# Patient Record
Sex: FEMALE | Race: WHITE | NOT HISPANIC OR LATINO | Employment: OTHER | URBAN - METROPOLITAN AREA
[De-identification: names, ages, dates, MRNs, and addresses within clinical notes are randomized per-mention and may not be internally consistent; named-entity substitution may affect disease eponyms.]

---

## 2024-03-01 RX ORDER — PRAVASTATIN SODIUM 40 MG/1
40 TABLET ORAL DAILY
COMMUNITY

## 2024-03-01 RX ORDER — FOLIC ACID 1 MG/1
1 TABLET ORAL DAILY
COMMUNITY

## 2024-03-01 RX ORDER — PAROXETINE 30 MG/1
30 TABLET, FILM COATED ORAL EVERY MORNING
COMMUNITY

## 2024-03-01 RX ORDER — OXYBUTYNIN CHLORIDE 5 MG/1
5 TABLET, EXTENDED RELEASE ORAL DAILY
Status: ON HOLD | COMMUNITY
End: 2024-03-19 | Stop reason: HOSPADM

## 2024-03-01 RX ORDER — BISOPROLOL FUMARATE 5 MG/1
5 TABLET, FILM COATED ORAL DAILY
COMMUNITY

## 2024-03-06 ENCOUNTER — TELEPHONE (OUTPATIENT)
Dept: PREADMISSION TESTING | Facility: HOSPITAL | Age: 79
End: 2024-03-06
Payer: MEDICARE

## 2024-03-06 ENCOUNTER — HOSPITAL ENCOUNTER (OUTPATIENT)
Dept: RADIOLOGY | Facility: HOSPITAL | Age: 79
Discharge: HOME | End: 2024-03-06
Attending: UROLOGY
Payer: MEDICARE

## 2024-03-06 ENCOUNTER — HOSPITAL ENCOUNTER (OUTPATIENT)
Dept: CARDIOLOGY | Facility: HOSPITAL | Age: 79
Discharge: HOME | End: 2024-03-06
Attending: UROLOGY
Payer: MEDICARE

## 2024-03-06 ENCOUNTER — OFFICE VISIT (OUTPATIENT)
Dept: UROLOGY | Facility: CLINIC | Age: 79
End: 2024-03-06
Payer: MEDICARE

## 2024-03-06 VITALS
SYSTOLIC BLOOD PRESSURE: 112 MMHG | BODY MASS INDEX: 23.71 KG/M2 | HEART RATE: 60 BPM | WEIGHT: 133.81 LBS | DIASTOLIC BLOOD PRESSURE: 69 MMHG | HEIGHT: 63 IN

## 2024-03-06 DIAGNOSIS — Z01.818 PRE-OP TESTING: ICD-10-CM

## 2024-03-06 DIAGNOSIS — N30.00 ACUTE CYSTITIS WITHOUT HEMATURIA: ICD-10-CM

## 2024-03-06 DIAGNOSIS — R33.9 INCOMPLETE BLADDER EMPTYING: ICD-10-CM

## 2024-03-06 DIAGNOSIS — N13.39 OTHER HYDRONEPHROSIS: ICD-10-CM

## 2024-03-06 DIAGNOSIS — R30.0 DYSURIA: Primary | ICD-10-CM

## 2024-03-06 LAB
BILIRUB UR QL STRIP: NEGATIVE
GLUCOSE UR QL STRIP: NEGATIVE
KETONES UR QL STRIP: NEGATIVE
LEUKOCYTE ESTERASE UR QL STRIP: POSITIVE
OHS QRS DURATION: 84 MS
OHS QTC CALCULATION: 424 MS
PH, POC UA: 6.5
POC BLOOD, URINE: POSITIVE
POC NITRATES, URINE: NEGATIVE
POC RESIDUAL URINE VOLUME: 160 ML (ref 0–100)
PROT UR QL STRIP: POSITIVE
SP GR UR STRIP: 1.01 (ref 1–1.03)
UROBILINOGEN UR STRIP-ACNC: 0.2 (ref 0.1–1.1)

## 2024-03-06 PROCEDURE — 51798 US URINE CAPACITY MEASURE: CPT | Mod: S$GLB,,, | Performed by: UROLOGY

## 2024-03-06 PROCEDURE — 3074F SYST BP LT 130 MM HG: CPT | Mod: CPTII,S$GLB,, | Performed by: UROLOGY

## 2024-03-06 PROCEDURE — 3078F DIAST BP <80 MM HG: CPT | Mod: CPTII,S$GLB,, | Performed by: UROLOGY

## 2024-03-06 PROCEDURE — 99999 PR PBB SHADOW E&M-EST. PATIENT-LVL IV: CPT | Mod: PBBFAC,,, | Performed by: UROLOGY

## 2024-03-06 PROCEDURE — 76770 US EXAM ABDO BACK WALL COMP: CPT | Mod: TC

## 2024-03-06 PROCEDURE — 99215 OFFICE O/P EST HI 40 MIN: CPT | Mod: S$GLB,,, | Performed by: UROLOGY

## 2024-03-06 PROCEDURE — 81003 URINALYSIS AUTO W/O SCOPE: CPT | Mod: QW,S$GLB,, | Performed by: UROLOGY

## 2024-03-06 PROCEDURE — 76770 US EXAM ABDO BACK WALL COMP: CPT | Mod: 26,,, | Performed by: RADIOLOGY

## 2024-03-06 PROCEDURE — 74018 RADEX ABDOMEN 1 VIEW: CPT | Mod: TC

## 2024-03-06 PROCEDURE — 93005 ELECTROCARDIOGRAM TRACING: CPT

## 2024-03-06 PROCEDURE — 1126F AMNT PAIN NOTED NONE PRSNT: CPT | Mod: CPTII,S$GLB,, | Performed by: UROLOGY

## 2024-03-06 PROCEDURE — 93010 ELECTROCARDIOGRAM REPORT: CPT | Mod: ,,, | Performed by: INTERNAL MEDICINE

## 2024-03-06 PROCEDURE — 3288F FALL RISK ASSESSMENT DOCD: CPT | Mod: CPTII,S$GLB,, | Performed by: UROLOGY

## 2024-03-06 PROCEDURE — 71046 X-RAY EXAM CHEST 2 VIEWS: CPT | Mod: 26,,, | Performed by: RADIOLOGY

## 2024-03-06 PROCEDURE — 1101F PT FALLS ASSESS-DOCD LE1/YR: CPT | Mod: CPTII,S$GLB,, | Performed by: UROLOGY

## 2024-03-06 PROCEDURE — 87086 URINE CULTURE/COLONY COUNT: CPT | Performed by: UROLOGY

## 2024-03-06 PROCEDURE — 74018 RADEX ABDOMEN 1 VIEW: CPT | Mod: 26,,, | Performed by: RADIOLOGY

## 2024-03-06 PROCEDURE — 71046 X-RAY EXAM CHEST 2 VIEWS: CPT | Mod: TC

## 2024-03-06 RX ORDER — PHENAZOPYRIDINE HYDROCHLORIDE 100 MG/1
100 TABLET, FILM COATED ORAL 3 TIMES DAILY PRN
Qty: 30 TABLET | Refills: 3 | Status: SHIPPED | OUTPATIENT
Start: 2024-03-06 | End: 2024-03-16

## 2024-03-06 NOTE — TELEPHONE ENCOUNTER
Pt had abnormal EKG. Needs cardiac clearance. Spoke with patient who stated that she does not see a cardiologist. Called appointment desk to assist patient in scheduling an appointment with cardiology to obtain cardiac clearance prior to surgery date (03/19). Apt 3/8 with Dr. Streeter. Pt v/u.  
Home

## 2024-03-06 NOTE — H&P (VIEW-ONLY)
Subjective:      Patient ID: Lovely Lopez is a 78 y.o. female.    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  She is undergoing chronic right ureteral stent exchanges.  Her most recent right ureteral stent exchange was in October 2023.  Denies any current flank or abdominal pain.  She is having some dysuria.  No gross hematuria.  No significant urgency or frequency.  She has incomplete bladder emptying.  She has a history of urinary retention in October 2023.  She is no longer taking oxybutynin.      Review of Systems   Constitutional:  Negative for chills and fever.   Respiratory:  Negative for shortness of breath.    Cardiovascular:  Negative for chest pain.   Gastrointestinal:  Negative for nausea and vomiting.   Genitourinary:  Positive for dysuria. Negative for difficulty urinating and hematuria.   Musculoskeletal:  Negative for back pain.   Skin:  Negative for rash.   Neurological:  Negative for dizziness.   Psychiatric/Behavioral:  Negative for agitation.            Objective:     Physical Exam  Constitutional:       General: She is not in acute distress.  Cardiovascular:      Rate and Rhythm: Normal rate.   Pulmonary:      Effort: Pulmonary effort is normal.   Abdominal:      General: There is no distension.      Palpations: Abdomen is soft.   Neurological:      Mental Status: She is alert and oriented to person, place, and time.         Assessment:      1. Pre-op testing    2. Acute cystitis without hematuria    3. Other hydronephrosis    4. Incomplete bladder emptying    5. 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  She is undergoing chronic right ureteral stent exchanges.  Her most recent right ureteral stent exchange was in October 2023.    - Cystitis.  - Dysuria.  - Incomplete bladder emptying.  She has a history of urinary retention in October 2023.      Plan:  - I discussed options with the patient today and the mutual decision was made to proceed to the OR for a cystoscopy, right retrograde pyelogram, right ureteral stent exchange, and another other indicated procedures.  Risks and benefits of the surgery were explained to the patient and the patient expressed understanding.  All questions were answered by me to the patient's apparent understanding and to the patient's apparent satisfaction.  Surgery consent was signed today by the patient.  - CBC, CMP, PT/PTT, chest x-ray, and EKG today.   - Urine culture today.  - KUB and renal ultrasound today.  - I prescribed her phenazopyridine 100 mg 1 PO q 8 hours PRN dysuria, disp #30, 3 refills today on 3-6-24.

## 2024-03-06 NOTE — PROGRESS NOTES
Subjective     Patient ID: Lovely Lopez is a 78 y.o. female.    Chief Complaint: Establishment    HPI  Review of Systems       Objective     Physical Exam       Assessment and Plan     1. Acute cystitis without hematuria  -     POCT Bladder Scan        ***         No follow-ups on file.

## 2024-03-08 ENCOUNTER — OFFICE VISIT (OUTPATIENT)
Dept: CARDIOLOGY | Facility: CLINIC | Age: 79
End: 2024-03-08
Payer: MEDICARE

## 2024-03-08 ENCOUNTER — TELEPHONE (OUTPATIENT)
Dept: UROLOGY | Facility: CLINIC | Age: 79
End: 2024-03-08
Payer: MEDICARE

## 2024-03-08 VITALS
HEART RATE: 68 BPM | HEIGHT: 63 IN | SYSTOLIC BLOOD PRESSURE: 112 MMHG | WEIGHT: 133.38 LBS | OXYGEN SATURATION: 95 % | DIASTOLIC BLOOD PRESSURE: 74 MMHG | BODY MASS INDEX: 23.63 KG/M2

## 2024-03-08 DIAGNOSIS — R94.31 ABNORMAL EKG: ICD-10-CM

## 2024-03-08 DIAGNOSIS — I70.0 AORTIC ATHEROSCLEROSIS: Primary | ICD-10-CM

## 2024-03-08 DIAGNOSIS — R00.1 BRADYCARDIA: ICD-10-CM

## 2024-03-08 DIAGNOSIS — N18.31 STAGE 3A CHRONIC KIDNEY DISEASE: ICD-10-CM

## 2024-03-08 DIAGNOSIS — Z01.810 PREOP CARDIOVASCULAR EXAM: ICD-10-CM

## 2024-03-08 DIAGNOSIS — I10 PRIMARY HYPERTENSION: ICD-10-CM

## 2024-03-08 DIAGNOSIS — E78.00 PURE HYPERCHOLESTEROLEMIA: ICD-10-CM

## 2024-03-08 LAB — BACTERIA UR CULT: NO GROWTH

## 2024-03-08 PROCEDURE — 1101F PT FALLS ASSESS-DOCD LE1/YR: CPT | Mod: CPTII,S$GLB,, | Performed by: INTERNAL MEDICINE

## 2024-03-08 PROCEDURE — 3288F FALL RISK ASSESSMENT DOCD: CPT | Mod: CPTII,S$GLB,, | Performed by: INTERNAL MEDICINE

## 2024-03-08 PROCEDURE — 1160F RVW MEDS BY RX/DR IN RCRD: CPT | Mod: CPTII,S$GLB,, | Performed by: INTERNAL MEDICINE

## 2024-03-08 PROCEDURE — 99203 OFFICE O/P NEW LOW 30 MIN: CPT | Mod: S$GLB,,, | Performed by: INTERNAL MEDICINE

## 2024-03-08 PROCEDURE — 1159F MED LIST DOCD IN RCRD: CPT | Mod: CPTII,S$GLB,, | Performed by: INTERNAL MEDICINE

## 2024-03-08 PROCEDURE — 3078F DIAST BP <80 MM HG: CPT | Mod: CPTII,S$GLB,, | Performed by: INTERNAL MEDICINE

## 2024-03-08 PROCEDURE — 99999 PR PBB SHADOW E&M-EST. PATIENT-LVL III: CPT | Mod: PBBFAC,,, | Performed by: INTERNAL MEDICINE

## 2024-03-08 PROCEDURE — 3074F SYST BP LT 130 MM HG: CPT | Mod: CPTII,S$GLB,, | Performed by: INTERNAL MEDICINE

## 2024-03-08 NOTE — PROGRESS NOTES
Subjective:   Patient ID:  Lovely Madsen is a 78 y.o. female who presents for evaluation of No chief complaint on file.      HPI  A 77 yo feamle with htn hlp abnormal EKG is here fro preop eval due to abnormal EKG she ahs sinus bradycardia she is on b blockers for htn. She is fairly active has no cardiac symptoms.   Past Medical History:   Diagnosis Date    Abnormal EKG 03/08/2024    Acute cystitis without hematuria 02/17/2022    First seen 02/17/2022, Last seen 09/19/2023    Cyst of kidney, acquired 05/20/2016    First seen 05/20/2016, Last seen 12/14/2022    Dysuria 09/19/2023    History of kidney cancer 2018    Hydronephrosis with ureteral stricture, not elsewhere classified 11/08/2022    Kidney stone 05/25/2022    Malignant neoplasm of left kidney, except renal pelvis 2016    Neoplasm of uncertain behavior of left kidney 2016    Other hydronephrosis 03/04/2020    First seen 03/04/2020, Last seen 09/19/2023    Other retention of urine 12/16/2022    Overactive bladder 09/03/2019    First seen 09/03/2019, Last seen 09/19/2023    Preop cardiovascular exam 03/08/2024    Stage 3a chronic kidney disease 03/08/2024       Past Surgical History:   Procedure Laterality Date    PARTIAL NEPHRECTOMY Left 07/18/2016    Left robotic partial nephrectomy       Social History     Tobacco Use    Smoking status: Never    Smokeless tobacco: Never   Substance Use Topics    Alcohol use: Not Currently    Drug use: Never       Family History   Problem Relation Age of Onset    Alzheimer's disease Father        Current Outpatient Medications   Medication Sig    bisoprolol (ZEBETA) 5 MG tablet Take 5 mg by mouth once daily.    folic acid (FOLVITE) 1 MG tablet Take 1 mg by mouth once daily.    oxybutynin (DITROPAN-XL) 5 MG TR24 Take 5 mg by mouth once daily.    paroxetine (PAXIL) 30 MG tablet Take 30 mg by mouth every morning.    phenazopyridine (PYRIDIUM) 100 MG tablet Take 1 tablet (100 mg total) by mouth 3 (three) times daily as needed  for Pain (Take 1 by mouth every 8 hours as needed for burning with urination.).    pravastatin (PRAVACHOL) 40 MG tablet Take 40 mg by mouth once daily.     No current facility-administered medications for this visit.     Current Outpatient Medications on File Prior to Visit   Medication Sig    bisoprolol (ZEBETA) 5 MG tablet Take 5 mg by mouth once daily.    folic acid (FOLVITE) 1 MG tablet Take 1 mg by mouth once daily.    oxybutynin (DITROPAN-XL) 5 MG TR24 Take 5 mg by mouth once daily.    paroxetine (PAXIL) 30 MG tablet Take 30 mg by mouth every morning.    phenazopyridine (PYRIDIUM) 100 MG tablet Take 1 tablet (100 mg total) by mouth 3 (three) times daily as needed for Pain (Take 1 by mouth every 8 hours as needed for burning with urination.).    pravastatin (PRAVACHOL) 40 MG tablet Take 40 mg by mouth once daily.     No current facility-administered medications on file prior to visit.       Review of patient's allergies indicates:  No Known Allergies    Review of Systems   Constitutional: Negative for malaise/fatigue.   Eyes:  Negative for blurred vision.   Cardiovascular:  Negative for chest pain, claudication, cyanosis, dyspnea on exertion, irregular heartbeat, leg swelling, near-syncope, orthopnea, palpitations and paroxysmal nocturnal dyspnea.   Respiratory:  Negative for cough, hemoptysis and shortness of breath.    Hematologic/Lymphatic: Negative for bleeding problem. Does not bruise/bleed easily.   Skin:  Negative for dry skin and itching.   Musculoskeletal:  Negative for falls, muscle weakness and myalgias.   Gastrointestinal:  Negative for abdominal pain, diarrhea, heartburn, hematemesis, hematochezia and melena.   Genitourinary:  Negative for flank pain and hematuria.   Neurological:  Negative for dizziness, focal weakness, headaches, light-headedness, numbness, paresthesias, seizures and weakness.   Psychiatric/Behavioral:  Negative for altered mental status and memory loss. The patient is not  "nervous/anxious.    Allergic/Immunologic: Negative for hives.       Objective:   Physical Exam  Constitutional:       General: She is not in acute distress.     Appearance: Normal appearance. She is well-developed. She is not ill-appearing.   HENT:      Head: Normocephalic and atraumatic.   Eyes:      General: No scleral icterus.     Pupils: Pupils are equal, round, and reactive to light.   Neck:      Thyroid: No thyromegaly.      Vascular: Normal carotid pulses. No carotid bruit, hepatojugular reflux or JVD.      Trachea: No tracheal deviation.   Cardiovascular:      Rate and Rhythm: Normal rate and regular rhythm.      Pulses: Normal pulses.      Heart sounds: Normal heart sounds. No murmur heard.     No friction rub. No gallop.   Pulmonary:      Effort: Pulmonary effort is normal. No respiratory distress.      Breath sounds: Normal breath sounds. No wheezing, rhonchi or rales.   Chest:      Chest wall: No tenderness.   Abdominal:      General: Bowel sounds are normal. There is no abdominal bruit.      Palpations: Abdomen is soft. There is no hepatomegaly or pulsatile mass.      Tenderness: There is no abdominal tenderness.   Musculoskeletal:      Right shoulder: No deformity.      Cervical back: Normal range of motion and neck supple.   Skin:     General: Skin is warm and dry.      Findings: No erythema or rash.      Nails: There is no clubbing.   Neurological:      Mental Status: She is alert and oriented to person, place, and time.      Cranial Nerves: No cranial nerve deficit.      Coordination: Coordination normal.   Psychiatric:         Speech: Speech normal.         Behavior: Behavior normal.       Vitals:    03/08/24 0910 03/08/24 0912   BP: 116/70 112/74   BP Location: Left arm Right arm   Patient Position: Sitting Sitting   BP Method: Medium (Manual) Medium (Manual)   Pulse: 68    SpO2: 95%    Weight: 60.5 kg (133 lb 6.1 oz)    Height: 5' 3" (1.6 m)      No results found for: "CHOL"  Body mass index is " "23.63 kg/m².   No results found for: "LABA1C", "HGBA1C"   BMP  Lab Results   Component Value Date     03/06/2024    K 3.8 03/06/2024     03/06/2024    CO2 31 (H) 03/06/2024    BUN 21 03/06/2024    CREATININE 1.2 03/06/2024    CALCIUM 9.8 03/06/2024    ANIONGAP 8 03/06/2024    EGFRNORACEVR 46.3 (A) 03/06/2024      No results found for: "HDL"  No results found for: "LDLCALC"  No results found for: "TRIG"  No results found for: "CHOLHDL"    Chemistry        Component Value Date/Time     03/06/2024 1053    K 3.8 03/06/2024 1053     03/06/2024 1053    CO2 31 (H) 03/06/2024 1053    BUN 21 03/06/2024 1053    CREATININE 1.2 03/06/2024 1053    GLU 89 03/06/2024 1053        Component Value Date/Time    CALCIUM 9.8 03/06/2024 1053          Lab Results   Component Value Date    TSH 2.47 05/16/2023     No results found for: "INR", "PROTIME"  Lab Results   Component Value Date    WBC 4.96 03/06/2024    HGB 12.6 03/06/2024    HCT 38.7 03/06/2024    MCV 94 03/06/2024     03/06/2024     BNP  @LABRCNTIP(BNP,BNPTRIAGEBLO)@  Estimated Creatinine Clearance: 32 mL/min (based on SCr of 1.2 mg/dL).  Assessment:     1. Aortic atherosclerosis    2. Preop cardiovascular exam    3. Stage 3a chronic kidney disease    4. Abnormal EKG    5. Bradycardia    6. Primary hypertension    7. Pure hypercholesterolemia      Asymptomatic with good exercise tolerance with a symptomatic sinus bradycardia with good control of htn and hlp needing surgery. no cardiac contraindications proceed as planned/  Plan:   Continue current therapy  Cardiac low salt diet.  Risk factor modification and excercise program.  F/u prn    "

## 2024-03-08 NOTE — TELEPHONE ENCOUNTER
M for patient that her surgery was scheduled for 03/19/2024 and below per Dr. Bee.    Loree Bone LPN    ----- Message from Wallace Bee MD sent at 3/8/2024  9:57 AM CST -----  Please call Ms Madsen and let her know that I reviewed her urine culture result and that it did not show a urinary infection.  Make sure that she knows the details of her surgery.  I recommend that she drink mostly water.  Ask her if she has any questions.

## 2024-03-14 ENCOUNTER — TELEPHONE (OUTPATIENT)
Dept: UROLOGY | Facility: CLINIC | Age: 79
End: 2024-03-14
Payer: MEDICARE

## 2024-03-14 ENCOUNTER — TELEPHONE (OUTPATIENT)
Dept: PREADMISSION TESTING | Facility: HOSPITAL | Age: 79
End: 2024-03-14
Payer: MEDICARE

## 2024-03-14 RX ORDER — DONEPEZIL HYDROCHLORIDE 10 MG/1
10 TABLET, FILM COATED ORAL
COMMUNITY
Start: 2023-09-22

## 2024-03-14 RX ORDER — TAMSULOSIN HYDROCHLORIDE 0.4 MG/1
0.4 CAPSULE ORAL
COMMUNITY

## 2024-03-14 RX ORDER — SULFAMETHOXAZOLE AND TRIMETHOPRIM 800; 160 MG/1; MG/1
1 TABLET ORAL 2 TIMES DAILY
Status: ON HOLD | COMMUNITY
Start: 2023-09-19 | End: 2024-03-19 | Stop reason: HOSPADM

## 2024-03-14 RX ORDER — MEMANTINE HYDROCHLORIDE 5 MG/1
5 TABLET ORAL
COMMUNITY
Start: 2023-07-19

## 2024-03-14 RX ORDER — GABAPENTIN 300 MG/1
300 CAPSULE ORAL 3 TIMES DAILY
COMMUNITY

## 2024-03-14 RX ORDER — MULTIVIT-MIN/FERROUS SULFATE 4.5 MG
TABLET ORAL
COMMUNITY

## 2024-03-14 RX ORDER — OMEPRAZOLE 40 MG/1
40 CAPSULE, DELAYED RELEASE ORAL
COMMUNITY
Start: 2023-09-18

## 2024-03-14 RX ORDER — CETIRIZINE HYDROCHLORIDE 10 MG/1
10 TABLET ORAL
COMMUNITY

## 2024-03-14 RX ORDER — KETOCONAZOLE 20 MG/ML
SHAMPOO, SUSPENSION TOPICAL
COMMUNITY
Start: 2023-12-13

## 2024-03-14 RX ORDER — MEMANTINE HYDROCHLORIDE 10 MG/1
10 TABLET ORAL 2 TIMES DAILY
COMMUNITY

## 2024-03-14 RX ORDER — CLOBETASOL PROPIONATE 0.46 MG/ML
SOLUTION TOPICAL
COMMUNITY
Start: 2023-05-08 | End: 2024-05-07

## 2024-03-14 RX ORDER — COLESTIPOL HYDROCHLORIDE 5 G/5G
GRANULE, FOR SUSPENSION ORAL
COMMUNITY
Start: 2023-10-15

## 2024-03-14 RX ORDER — HYDROCHLOROTHIAZIDE 12.5 MG/1
12.5 TABLET ORAL
COMMUNITY

## 2024-03-14 RX ORDER — UBIDECARENONE 75 MG
CAPSULE ORAL
COMMUNITY

## 2024-03-14 RX ORDER — ESCITALOPRAM OXALATE 10 MG/1
10 TABLET ORAL
COMMUNITY

## 2024-03-14 NOTE — TELEPHONE ENCOUNTER
Pre op instructions reviewed with patient per phone.      To confirm, your doctor has instructed you: Surgery is scheduled for 03/19/24.   Pre admit office will call the afternoon prior to surgery between 1PM and 3PM with arrival time.    Surgery will be at [facility],  The address is [address]. [place] [address].      IMPORTANT INSTRUCTIONS!    Do not eat or drink after 12 midnight, including water. Do not smoke or use chewing tobacco after 12 midnight!  OK to brush teeth, but no gum, candy, or mints!      *Take only these medicines with a small swallow of water-morning of surgery*     Pravastatin, bisoprolol, paxil, donepezil, lexapro, gabapentin, namenda, omeprazole       ____ Stop taking all vitamins, herbal supplements, Aspirin, & NSAIDS (Ibuprofen, Advil, Aleve) 7 days prior to surgery, as these can thin your blood.    ____ Weight loss medication, such as Adipex and Phentermine, must be stopped 14 days prior to surgery, no exceptions!    *Diabetic Patients: If you take diabetic or weight loss medication, do NOT take morning of surgery unless instructed by Doctor. Metformin to be stopped 24 hrs prior to surgery. DO NOT take short-acting insulin the day of surgery. Only take HALF of your regular dose of long-acting insulin the night before surgery, unless instructed otherwise. Blood sugars will be checked in pre-op.   ~Ozempic/Mounjaro/Wegovy/Trulicity/Semaglutide injections must be stopped 7 days prior to surgery.     Please notify MD office if you develop an active infection, are prescribed antibiotics by someone other than the surgeon doing your surgery, or visit urgent care/ER.      Bathing Instructions:   The night before surgery and the morning prior to coming to the hospital:    - Shower & rinse your body as usual with anti-bacterial Soap (Dial or Lever 2000)   -Hibiclens (if indicated) use AFTER anti-bacterial soap; 1 packet PM/1 packet in AM on surgical site only   -Do not use  hibiclens on your head, face, or genitals.    -Do not wash with anti-bacterial soap after you use the hibiclens.    -Do not shave surgical site 5-7 days prior to surgery.    -Pubic hair 7 days prior to surgery (OBGYN/Urology only).       Additional Instructions:   __ No powder, lotions, creams, or body spray to skin   __ No deodorant if you are having: breast procedure, PORT, or upper shoulder surgery!   __ No nail polish or artificial nails       **SURGERY WILL BE CANCELLED IF ARTIFICIAL/NAIL POLISH IS PRESENT!!!**  __ Please remove all piercings and leave all jewelry at home.    **SURGERY WILL BE CANCELLED IF PIERCINGS ARE PRESENT!!!**    __ Dentures, Hearing Aids and Contact Lens need to be removed prior to the start of surgery.    __ Avoid Alcoholic beverages 3 days prior to surgery, as it can thin the blood, unless told otherwise by pre-admit department.  __ Females: may need to give a urine sample the morning of surgery;   **Please ask  for a specimen cup if you need to use the restroom prior to being called into pre-op.**  __ Males: Stop ED medications (Viagra, Cialis) 24 hrs prior to surgery.  __ Wear clean, loose-fitting clothing. Allow for dressings/bandages/surgical equipment   __ You must have transportation, and they MUST stay the entire time.   __  Bring photo ID and insurance information to \[facility]\""sner Visitor/Ride Policy:   Only 1 adult allowed (over the age of 18) to accompany you and MUST STAY through the entire length of admission.     -Must have a ride home from a responsible adult that you know and trust.    -Medical Transport, Uber or Lyft can only be used if patient has a responsible adult to accompany them during ride home.    ~Your ride MUST STAY the entire time until you are discharged~        Post-Op Instructions: You will receive surgery post-op instructions by your Discharge Nurse prior to going home.   Surgical Site Infection:   Prevention of surgical site  infections:   -Keep incisions clean and dry.   -Do not soak/submerge incisions in water until completely healed.   -Do not apply lotions, powders, creams, or deodorants to site.   -Always make sure hands are cleaned with antibacterial soap/ alcohol-based  prior to touching the surgical site.       Signs and symptoms:               -Redness and pain around the area where you had surgery               -Drainage of cloudy fluid from your surgical wound               -Fever over 100.4 or chills     >>>Call Surgeon office/on-call Surgeon if you experience any of these signs & symptoms post-surgery @ [phone].    *If you are running late or have questions the morning of surgery before 7AM, please call the Pre-OP Department @ [phone].    *Please Call [facility] Pre-Admit Department for surgery instruction questions:  [phone]    *Payment questions:  [phone]    *Billing questions:  [phone]

## 2024-03-14 NOTE — TELEPHONE ENCOUNTER
LVM for patient to return call and that she needs to stop her vitamins today so that her surgery can proceed on Tuesday.    Loree Bone LPN

## 2024-03-18 ENCOUNTER — ANESTHESIA EVENT (OUTPATIENT)
Dept: SURGERY | Facility: HOSPITAL | Age: 79
End: 2024-03-18
Payer: MEDICARE

## 2024-03-18 ENCOUNTER — TELEPHONE (OUTPATIENT)
Dept: PREADMISSION TESTING | Facility: HOSPITAL | Age: 79
End: 2024-03-18
Payer: MEDICARE

## 2024-03-18 NOTE — TELEPHONE ENCOUNTER
Called and spoke with the patient about the following:     Your Surgery arrival time is at 7:00 a.m. on 03/19/24 at [facility] location.   The address is [address]. [place] [address].      *If you are running late or have questions the morning of surgery, please call the Pre-OP Department @ [phone].    Only 1 adult allowed (over the age of 18) to accompany you and MUST STAY through the entire length of admission.     -Must have a ride home from a responsible adult that you know and trust.    -Medical Transport, Uber or Lyft can only be used if patient has a responsible adult to accompany them during ride home.  Pediatric patients are encouraged to have 2 adults accompany them to the surgery center.     ~Your ride MUST STAY the entire time until you are discharged~    You may be required to provide a urine sample prior to procedure;   Please ask  for a specimen cup if you need to use the restroom prior to being called into pre-op.    Please come to the main lobby and be prepared to show your photo ID and insurance card.      Nothing to eat or drink after midnight, unless you were instructed to take specific medications discussed with the Pre-admit Nurse.    Please call with any questions or concerns.     [phone]      Thanks.

## 2024-03-18 NOTE — ANESTHESIA PREPROCEDURE EVALUATION
03/18/2024  Lovely Madsen is a 78 y.o., female.  Past Medical History:   Diagnosis Date    Abnormal EKG 03/08/2024    Acute cystitis without hematuria 02/17/2022    First seen 02/17/2022, Last seen 09/19/2023    Cyst of kidney, acquired 05/20/2016    First seen 05/20/2016, Last seen 12/14/2022    Dysuria 09/19/2023    History of kidney cancer 2018    Hydronephrosis with ureteral stricture, not elsewhere classified 11/08/2022    Kidney stone 05/25/2022    Malignant neoplasm of left kidney, except renal pelvis 2016    Neoplasm of uncertain behavior of left kidney 2016    Other hydronephrosis 03/04/2020    First seen 03/04/2020, Last seen 09/19/2023    Other retention of urine 12/16/2022    Overactive bladder 09/03/2019    First seen 09/03/2019, Last seen 09/19/2023    Preop cardiovascular exam 03/08/2024    Stage 3a chronic kidney disease 03/08/2024     Past Surgical History:   Procedure Laterality Date    COLON SURGERY      PARTIAL NEPHRECTOMY Left 07/18/2016    Left robotic partial nephrectomy         Pre-op Assessment    I have reviewed the Patient Summary Reports.    I have reviewed the NPO Status.   I have reviewed the Medications.     Review of Systems  Anesthesia Hx:   History of prior surgery of interest to airway management or planning:            Denies Personal Hx of Anesthesia complications.                    Hematology/Oncology:                        --  Cancer in past history:                     EENT/Dental:  chronic allergic rhinitis           Cardiovascular:     Hypertension           hyperlipidemia                       Hypertension         Renal/:  Chronic Renal Disease, CKD renal calculi       Kidney Function/Disease             Hepatic/GI:     GERD             Psych:    depression                Physical Exam  General: Well nourished    Airway:  Mallampati: II   Mouth Opening:  Normal  TM Distance: Normal  Tongue: Normal  Neck ROM: Normal ROM    Dental:  Intact        Anesthesia Plan  Type of Anesthesia, risks & benefits discussed:    Anesthesia Type: Gen ETT, Gen Supraglottic Airway, Gen Natural Airway, MAC  Intra-op Monitoring Plan: Standard ASA Monitors  Post Op Pain Control Plan: multimodal analgesia  Induction:  IV  Informed Consent: Informed consent signed with the Patient and all parties understand the risks and agree with anesthesia plan.  All questions answered. Patient consented to blood products? No  ASA Score: 3  Day of Surgery Review of History & Physical: H&P Update referred to the surgeon/provider.    Ready For Surgery From Anesthesia Perspective.     .

## 2024-03-19 ENCOUNTER — ANESTHESIA (OUTPATIENT)
Dept: SURGERY | Facility: HOSPITAL | Age: 79
End: 2024-03-19
Payer: MEDICARE

## 2024-03-19 ENCOUNTER — HOSPITAL ENCOUNTER (OUTPATIENT)
Dept: RADIOLOGY | Facility: HOSPITAL | Age: 79
Discharge: HOME | End: 2024-03-19
Attending: UROLOGY | Admitting: UROLOGY
Payer: MEDICARE

## 2024-03-19 ENCOUNTER — TELEPHONE (OUTPATIENT)
Dept: UROLOGY | Facility: CLINIC | Age: 79
End: 2024-03-19
Payer: MEDICARE

## 2024-03-19 ENCOUNTER — HOSPITAL ENCOUNTER (OUTPATIENT)
Facility: HOSPITAL | Age: 79
Discharge: HOME | End: 2024-03-19
Attending: UROLOGY | Admitting: UROLOGY
Payer: MEDICARE

## 2024-03-19 DIAGNOSIS — N13.39 OTHER HYDRONEPHROSIS: ICD-10-CM

## 2024-03-19 DIAGNOSIS — N13.39 OTHER HYDRONEPHROSIS: Primary | ICD-10-CM

## 2024-03-19 DIAGNOSIS — Z01.818 PRE-OP TESTING: ICD-10-CM

## 2024-03-19 PROCEDURE — D9220A PRA ANESTHESIA: Mod: ,,, | Performed by: NURSE ANESTHETIST, CERTIFIED REGISTERED

## 2024-03-19 PROCEDURE — 63600175 PHARM REV CODE 636 W HCPCS: Performed by: NURSE ANESTHETIST, CERTIFIED REGISTERED

## 2024-03-19 PROCEDURE — C1769 GUIDE WIRE: HCPCS | Performed by: UROLOGY

## 2024-03-19 PROCEDURE — C2617 STENT, NON-COR, TEM W/O DEL: HCPCS | Performed by: UROLOGY

## 2024-03-19 PROCEDURE — 71000016 HC POSTOP RECOV ADDL HR: Performed by: UROLOGY

## 2024-03-19 PROCEDURE — 63600175 PHARM REV CODE 636 W HCPCS: Performed by: UROLOGY

## 2024-03-19 PROCEDURE — 25500020 PHARM REV CODE 255: Performed by: UROLOGY

## 2024-03-19 PROCEDURE — 36000707: Performed by: UROLOGY

## 2024-03-19 PROCEDURE — 37000008 HC ANESTHESIA 1ST 15 MINUTES: Performed by: UROLOGY

## 2024-03-19 PROCEDURE — 71000015 HC POSTOP RECOV 1ST HR: Performed by: UROLOGY

## 2024-03-19 PROCEDURE — 36000706: Performed by: UROLOGY

## 2024-03-19 PROCEDURE — 74018 RADEX ABDOMEN 1 VIEW: CPT | Mod: TC

## 2024-03-19 PROCEDURE — 25000003 PHARM REV CODE 250: Performed by: NURSE ANESTHETIST, CERTIFIED REGISTERED

## 2024-03-19 PROCEDURE — 25000003 PHARM REV CODE 250: Performed by: UROLOGY

## 2024-03-19 PROCEDURE — 74420 UROGRAPHY RTRGR +-KUB: CPT | Mod: 26,,, | Performed by: UROLOGY

## 2024-03-19 PROCEDURE — 52332 CYSTOSCOPY AND TREATMENT: CPT | Mod: RT,,, | Performed by: UROLOGY

## 2024-03-19 PROCEDURE — 37000009 HC ANESTHESIA EA ADD 15 MINS: Performed by: UROLOGY

## 2024-03-19 PROCEDURE — 71000033 HC RECOVERY, INTIAL HOUR: Performed by: UROLOGY

## 2024-03-19 PROCEDURE — 74018 RADEX ABDOMEN 1 VIEW: CPT | Mod: 26,,, | Performed by: RADIOLOGY

## 2024-03-19 PROCEDURE — C1758 CATHETER, URETERAL: HCPCS | Performed by: UROLOGY

## 2024-03-19 RX ORDER — ACETAMINOPHEN 10 MG/ML
INJECTION, SOLUTION INTRAVENOUS
Status: DISCONTINUED | OUTPATIENT
Start: 2024-03-19 | End: 2024-03-19

## 2024-03-19 RX ORDER — MEPERIDINE HYDROCHLORIDE 25 MG/ML
12.5 INJECTION INTRAMUSCULAR; INTRAVENOUS; SUBCUTANEOUS ONCE AS NEEDED
Status: DISCONTINUED | OUTPATIENT
Start: 2024-03-19 | End: 2024-03-19 | Stop reason: HOSPADM

## 2024-03-19 RX ORDER — FENTANYL CITRATE 50 UG/ML
25 INJECTION, SOLUTION INTRAMUSCULAR; INTRAVENOUS EVERY 5 MIN PRN
Status: DISCONTINUED | OUTPATIENT
Start: 2024-03-19 | End: 2024-03-19 | Stop reason: HOSPADM

## 2024-03-19 RX ORDER — DEXAMETHASONE SODIUM PHOSPHATE 4 MG/ML
INJECTION, SOLUTION INTRA-ARTICULAR; INTRALESIONAL; INTRAMUSCULAR; INTRAVENOUS; SOFT TISSUE
Status: DISCONTINUED | OUTPATIENT
Start: 2024-03-19 | End: 2024-03-19

## 2024-03-19 RX ORDER — ONDANSETRON HYDROCHLORIDE 2 MG/ML
4 INJECTION, SOLUTION INTRAVENOUS DAILY PRN
Status: DISCONTINUED | OUTPATIENT
Start: 2024-03-19 | End: 2024-03-19 | Stop reason: HOSPADM

## 2024-03-19 RX ORDER — OXYCODONE AND ACETAMINOPHEN 5; 325 MG/1; MG/1
1 TABLET ORAL
Status: DISCONTINUED | OUTPATIENT
Start: 2024-03-19 | End: 2024-03-19 | Stop reason: HOSPADM

## 2024-03-19 RX ORDER — HYDROCODONE BITARTRATE AND ACETAMINOPHEN 5; 325 MG/1; MG/1
1 TABLET ORAL EVERY 6 HOURS PRN
Qty: 20 TABLET | Refills: 0 | Status: SHIPPED | OUTPATIENT
Start: 2024-03-19

## 2024-03-19 RX ORDER — EPHEDRINE SULFATE 50 MG/ML
INJECTION, SOLUTION INTRAVENOUS
Status: DISCONTINUED | OUTPATIENT
Start: 2024-03-19 | End: 2024-03-19

## 2024-03-19 RX ORDER — PROPOFOL 10 MG/ML
VIAL (ML) INTRAVENOUS
Status: DISCONTINUED | OUTPATIENT
Start: 2024-03-19 | End: 2024-03-19

## 2024-03-19 RX ORDER — ONDANSETRON HYDROCHLORIDE 2 MG/ML
INJECTION, SOLUTION INTRAVENOUS
Status: DISCONTINUED | OUTPATIENT
Start: 2024-03-19 | End: 2024-03-19

## 2024-03-19 RX ORDER — LIDOCAINE HYDROCHLORIDE 20 MG/ML
INJECTION, SOLUTION EPIDURAL; INFILTRATION; INTRACAUDAL; PERINEURAL
Status: DISCONTINUED | OUTPATIENT
Start: 2024-03-19 | End: 2024-03-19

## 2024-03-19 RX ORDER — HYOSCYAMINE SULFATE 0.12 MG/1
0.12 TABLET SUBLINGUAL EVERY 6 HOURS PRN
Qty: 30 TABLET | Refills: 6 | Status: SHIPPED | OUTPATIENT
Start: 2024-03-19

## 2024-03-19 RX ORDER — SUCCINYLCHOLINE CHLORIDE 20 MG/ML
INJECTION INTRAMUSCULAR; INTRAVENOUS
Status: DISCONTINUED | OUTPATIENT
Start: 2024-03-19 | End: 2024-03-19

## 2024-03-19 RX ORDER — FENTANYL CITRATE 50 UG/ML
INJECTION, SOLUTION INTRAMUSCULAR; INTRAVENOUS
Status: DISCONTINUED | OUTPATIENT
Start: 2024-03-19 | End: 2024-03-19

## 2024-03-19 RX ADMIN — EPHEDRINE SULFATE 10 MG: 50 INJECTION INTRAVENOUS at 09:03

## 2024-03-19 RX ADMIN — DEXAMETHASONE SODIUM PHOSPHATE 8 MG: 4 INJECTION, SOLUTION INTRA-ARTICULAR; INTRALESIONAL; INTRAMUSCULAR; INTRAVENOUS; SOFT TISSUE at 09:03

## 2024-03-19 RX ADMIN — LIDOCAINE HYDROCHLORIDE 60 MG: 20 INJECTION, SOLUTION EPIDURAL; INFILTRATION; INTRACAUDAL; PERINEURAL at 09:03

## 2024-03-19 RX ADMIN — CEFTRIAXONE SODIUM 1 G: 1 INJECTION, POWDER, FOR SOLUTION INTRAMUSCULAR; INTRAVENOUS at 09:03

## 2024-03-19 RX ADMIN — ACETAMINOPHEN 800 MG: 10 INJECTION, SOLUTION INTRAVENOUS at 09:03

## 2024-03-19 RX ADMIN — SUCCINYLCHOLINE CHLORIDE 40 MG: 20 INJECTION, SOLUTION INTRAMUSCULAR; INTRAVENOUS; PARENTERAL at 09:03

## 2024-03-19 RX ADMIN — GLYCOPYRROLATE 0.2 MG: 0.2 INJECTION, SOLUTION INTRAMUSCULAR; INTRAVITREAL at 09:03

## 2024-03-19 RX ADMIN — ONDANSETRON 4 MG: 2 INJECTION INTRAMUSCULAR; INTRAVENOUS at 09:03

## 2024-03-19 RX ADMIN — EPHEDRINE SULFATE 5 MG: 50 INJECTION INTRAVENOUS at 09:03

## 2024-03-19 RX ADMIN — PROPOFOL 100 MG: 10 INJECTION, EMULSION INTRAVENOUS at 09:03

## 2024-03-19 RX ADMIN — FENTANYL CITRATE 25 MCG: 50 INJECTION, SOLUTION INTRAMUSCULAR; INTRAVENOUS at 09:03

## 2024-03-19 NOTE — ANESTHESIA POSTPROCEDURE EVALUATION
Anesthesia Post Evaluation    Patient: Lovely Madsen    Procedure(s) Performed: Procedure(s) (LRB):  CYSTOSCOPY, WITH RETROGRADE PYELOGRAM AND URETERAL STENT INSERTION (Right)    Final Anesthesia Type: general      Patient location during evaluation: PACU  Patient participation: Yes- Able to Participate  Level of consciousness: awake  Post-procedure vital signs: reviewed and stable  Pain management: adequate  Airway patency: patent    PONV status at discharge: No PONV  Anesthetic complications: no      Cardiovascular status: stable  Respiratory status: unassisted  Hydration status: euvolemic  Follow-up not needed.              Vitals Value Taken Time   /61 03/19/24 1024   Temp 36.4 °C (97.5 °F) 03/19/24 0952   Pulse 85 03/19/24 1024   Resp 13 03/19/24 1024   SpO2 97 % 03/19/24 1024   Vitals shown include unvalidated device data.      No case tracking events are documented in the log.      Pain/Michelle Score: Michelle Score: 10 (3/19/2024 10:10 AM)

## 2024-03-19 NOTE — TELEPHONE ENCOUNTER
Pt was scheduled for tomorrow at 8am. I called and left a detailed message on patient phone. I called  and spoke with him directly and advised him of  the appointment.     ----- Message from Wallace Bee MD sent at 3/19/2024 12:47 PM CDT -----  Ms Madsen was unable to void after her surgery today so she is being discharged home today with a lopez catheter in place.  Please schedule her an appointment to see me tomorrow morning Wednesday 3- at 8 am.

## 2024-03-19 NOTE — TRANSFER OF CARE
"Anesthesia Transfer of Care Note    Patient: Lovely Madsen    Procedure(s) Performed: Procedure(s) (LRB):  CYSTOSCOPY, WITH RETROGRADE PYELOGRAM AND URETERAL STENT INSERTION (Right)    Patient location: PACU    Anesthesia Type: general    Transport from OR: Transported from OR on room air with adequate spontaneous ventilation    Post pain: adequate analgesia    Post assessment: no apparent anesthetic complications and tolerated procedure well    Post vital signs: stable    Level of consciousness: sedated    Nausea/Vomiting: no nausea/vomiting    Complications: none    Transfer of care protocol was followed      Last vitals: Visit Vitals  /71 (BP Location: Right arm, Patient Position: Sitting)   Pulse 68   Temp 36.2 °C (97.2 °F) (Temporal)   Ht 5' 3" (1.6 m)   Wt 60 kg (132 lb 4.4 oz)   LMP  (LMP Unknown)   SpO2 98%   Breastfeeding No   BMI 23.43 kg/m²     "

## 2024-03-20 ENCOUNTER — OFFICE VISIT (OUTPATIENT)
Dept: UROLOGY | Facility: CLINIC | Age: 79
End: 2024-03-20
Payer: MEDICARE

## 2024-03-20 ENCOUNTER — TELEPHONE (OUTPATIENT)
Dept: UROLOGY | Facility: CLINIC | Age: 79
End: 2024-03-20
Payer: MEDICARE

## 2024-03-20 VITALS
WEIGHT: 132.25 LBS | DIASTOLIC BLOOD PRESSURE: 78 MMHG | TEMPERATURE: 98 F | HEIGHT: 63 IN | HEART RATE: 88 BPM | RESPIRATION RATE: 18 BRPM | OXYGEN SATURATION: 98 % | SYSTOLIC BLOOD PRESSURE: 134 MMHG | BODY MASS INDEX: 23.43 KG/M2

## 2024-03-20 VITALS
SYSTOLIC BLOOD PRESSURE: 126 MMHG | WEIGHT: 132.25 LBS | HEIGHT: 63 IN | BODY MASS INDEX: 23.43 KG/M2 | DIASTOLIC BLOOD PRESSURE: 76 MMHG | HEART RATE: 64 BPM

## 2024-03-20 DIAGNOSIS — N13.39 OTHER HYDRONEPHROSIS: ICD-10-CM

## 2024-03-20 DIAGNOSIS — R33.8 ACUTE URINARY RETENTION: Primary | ICD-10-CM

## 2024-03-20 PROCEDURE — 99999 PR PBB SHADOW E&M-EST. PATIENT-LVL IV: CPT | Mod: PBBFAC,,, | Performed by: UROLOGY

## 2024-03-20 PROCEDURE — 1101F PT FALLS ASSESS-DOCD LE1/YR: CPT | Mod: CPTII,S$GLB,, | Performed by: UROLOGY

## 2024-03-20 PROCEDURE — 3074F SYST BP LT 130 MM HG: CPT | Mod: CPTII,S$GLB,, | Performed by: UROLOGY

## 2024-03-20 PROCEDURE — 99213 OFFICE O/P EST LOW 20 MIN: CPT | Mod: S$GLB,,, | Performed by: UROLOGY

## 2024-03-20 PROCEDURE — 3078F DIAST BP <80 MM HG: CPT | Mod: CPTII,S$GLB,, | Performed by: UROLOGY

## 2024-03-20 PROCEDURE — 3288F FALL RISK ASSESSMENT DOCD: CPT | Mod: CPTII,S$GLB,, | Performed by: UROLOGY

## 2024-03-20 PROCEDURE — 1125F AMNT PAIN NOTED PAIN PRSNT: CPT | Mod: CPTII,S$GLB,, | Performed by: UROLOGY

## 2024-03-20 PROCEDURE — 1159F MED LIST DOCD IN RCRD: CPT | Mod: CPTII,S$GLB,, | Performed by: UROLOGY

## 2024-03-20 NOTE — TELEPHONE ENCOUNTER
Patient was scheduled.     ----- Message from Wallace Bee MD sent at 3/19/2024  4:24 PM CDT -----  Regarding: RE: Appointment.  Yes.  Please also schedule Ms Tena galindo KUB and renal ultrasound on either 4-8-2024 or 4-9-2024.  Schedule her a postoperative visit to see me on Wednesday 4-.  Thank you!    ----- Message -----  From: Sarahi Campuzano MA  Sent: 3/19/2024   3:03 PM CDT  To: Wallace Bee MD  Subject: Appointment.                                     Did you still want me to schedule patient for this too following her appointment for tomorrow at 8am?   ----- Message -----  From: Wallace Bee MD  Sent: 3/19/2024  12:41 PM CDT  To: Rohit Marinelli    Please call and schedule Ms Tena galindo KUB and renal ultrasound on either 4-8-2024 or 4-9-2024.  Schedule her a postoperative visit to see me on Wednesday 4-.

## 2024-03-20 NOTE — PROGRESS NOTES
Subjective:       Patient ID: Lovely Madsen is a 78 y.o. female.    Chief Complaint: No chief complaint on file.      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  She is undergoing chronic right ureteral stent exchanges.  Her most recent right ureteral stent exchange was on 3-19-24.  She was unable to void after this procedure and she was sent home with a lopez catheter in place.  Her lopez catheter is working well.  She would like her lopez catheter removed this morning if possible.  She says she has not taken any hydrocodone.  She is only taking tylenol for mild pain.           Past Medical History:   Diagnosis Date    Abnormal EKG 03/08/2024    Acute cystitis without hematuria 02/17/2022    First seen 02/17/2022, Last seen 09/19/2023    Cyst of kidney, acquired 05/20/2016    First seen 05/20/2016, Last seen 12/14/2022    Dysuria 09/19/2023    History of kidney cancer 2018    Hydronephrosis with ureteral stricture, not elsewhere classified 11/08/2022    Kidney stone 05/25/2022    Malignant neoplasm of left kidney, except renal pelvis 2016    Neoplasm of uncertain behavior of left kidney 2016    Other hydronephrosis 03/04/2020    First seen 03/04/2020, Last seen 09/19/2023    Other retention of urine 12/16/2022    Overactive bladder 09/03/2019    First seen 09/03/2019, Last seen 09/19/2023    Preop cardiovascular exam 03/08/2024    Stage 3a chronic kidney disease 03/08/2024     Family History   Problem Relation Age of Onset    Alzheimer's disease Father      Social History     Socioeconomic History    Marital status:    Tobacco Use    Smoking status: Never    Smokeless tobacco: Never   Substance and Sexual Activity    Alcohol use: Not Currently    Drug use: Never     Outpatient Encounter Medications as of 3/20/2024   Medication Sig Dispense Refill    bisoprolol (ZEBETA) 5 MG tablet Take 5 mg by  mouth once daily.      cetirizine (ZYRTEC) 10 MG tablet Take 10 mg by mouth.      clobetasoL (TEMOVATE) 0.05 % external solution Apply nightly as needed for itching or rash to scalp      colestipoL (COLESTID) 5 gram Pack MIX THE CONTENTS OF 1 PACKET IN LIQUID AS DIRECTED AND DRINK THREE TIMES DAILY      cyanocobalamin 500 MCG tablet Take by mouth.      donepeziL (ARICEPT) 10 MG tablet Take 10 mg by mouth.      EScitalopram oxalate (LEXAPRO) 10 MG tablet Take 10 mg by mouth.      folic acid (FOLVITE) 1 MG tablet Take 1 mg by mouth once daily.      gabapentin (NEURONTIN) 300 MG capsule Take 300 mg by mouth 3 (three) times daily.      hydroCHLOROthiazide (HYDRODIURIL) 12.5 MG Tab Take 12.5 mg by mouth.      HYDROcodone-acetaminophen (NORCO) 5-325 mg per tablet Take 1 tablet by mouth every 6 (six) hours as needed for Pain (Take 1 by mouth every 6 hours as needed for pain). 20 tablet 0    hyoscyamine (LEVSIN) 0.125 mg Subl Place 1 tablet (0.125 mg total) under the tongue every 6 (six) hours as needed (Take 1 sublingual every 6 hours PRN bladder spasms). 30 tablet 6    ketoconazole (NIZORAL) 2 % shampoo Apply topically twice a week.      memantine (NAMENDA) 10 MG Tab Take 10 mg by mouth 2 (two) times daily.      memantine (NAMENDA) 5 MG Tab Take 5 mg by mouth.      multivit-min-ferrous sulfate (ONE DAILY MULTI-VIT W-MINERAL) 4.5 mg iron Tab Take by mouth.      omeprazole (PRILOSEC) 40 MG capsule Take 40 mg by mouth.      paroxetine (PAXIL) 30 MG tablet Take 30 mg by mouth every morning.      potassium bicarbonate (K-LYTE) disintegrating tablet Take 25 mEq by mouth.      pravastatin (PRAVACHOL) 40 MG tablet Take 40 mg by mouth once daily.      tamsulosin (FLOMAX) 0.4 mg Cap Take 0.4 mg by mouth.      [DISCONTINUED] oxybutynin (DITROPAN-XL) 5 MG TR24 Take 5 mg by mouth once daily.      [DISCONTINUED] sulfamethoxazole-trimethoprim 800-160mg (BACTRIM DS) 800-160 mg Tab Take 1 tablet by mouth 2 (two) times daily.        Facility-Administered Encounter Medications as of 3/20/2024   Medication Dose Route Frequency Provider Last Rate Last Admin    [COMPLETED] cefTRIAXone (Rocephin) 1 g in dextrose 5 % in water (D5W) 100 mL IVPB (MB+)  1 g Intravenous On Call Procedure Wallace Bee MD   1 g at 03/19/24 0912    [DISCONTINUED] acetaminophen 1,000 mg/100 mL (10 mg/mL) injection   Intravenous PRN Gloria, Conchita U., CRNA   800 mg at 03/19/24 0928    [DISCONTINUED] dexAMETHasone injection   Intravenous PRN Gloria, Conchita U., CRNA   8 mg at 03/19/24 0916    [DISCONTINUED] ePHEDrine sulfate   Intravenous PRN Gloria, Conchita U., CRNA   10 mg at 03/19/24 0940    [DISCONTINUED] fentaNYL 50 mcg/mL injection 25 mcg  25 mcg Intravenous Q5 Min PRN Judd Pena MD        [DISCONTINUED] fentaNYL injection   Intravenous PRN Gloria, Conchita U., CRNA   25 mcg at 03/19/24 0932    [DISCONTINUED] glycopyrrolate (PF) injection   Intravenous PRN Gloria, Conchita U., CRNA   0.2 mg at 03/19/24 0917    [DISCONTINUED] iohexoL (OMNIPAQUE 240) 240 mg iodine/mL injection             [DISCONTINUED] iohexoL (OMNIPAQUE 240) injection    PRN Wallace Bee MD   50 mL at 03/19/24 0938    [DISCONTINUED] lactated ringers infusion   Intravenous Continuous PRN Gloria, Conchita U., CRNA   New Bag at 03/19/24 0901    [DISCONTINUED] LIDOcaine (PF) 20 mg/mL (2%) injection   Intravenous PRN Gloria Conchiat U., CRNA   60 mg at 03/19/24 0906    [DISCONTINUED] meperidine (PF) injection 12.5 mg  12.5 mg Intravenous Once PRN Judd Pena MD        [DISCONTINUED] ondansetron injection 4 mg  4 mg Intravenous Daily PRN Judd Pena MD        [DISCONTINUED] ondansetron injection   Intravenous PRN Gloria, Conchita U., CRNA   4 mg at 03/19/24 0930    [DISCONTINUED] oxyCODONE-acetaminophen 5-325 mg per tablet 1 tablet  1 tablet Oral Q3H PRN Judd Pena MD        [DISCONTINUED] propofol (DIPRIVAN) 10 mg/mL infusion   Intravenous PRN  "Conchita Castro U., CRNA   100 mg at 03/19/24 0906    [DISCONTINUED] succinylcholine injection   Intravenous PRN Conchita Castro., CRNA   40 mg at 03/19/24 0907        Review of Systems   Constitutional:  Negative for chills and fever.   Respiratory:  Negative for shortness of breath.    Cardiovascular:  Negative for chest pain.   Gastrointestinal:  Negative for nausea and vomiting.   Genitourinary:  Positive for difficulty urinating. Negative for hematuria.   Musculoskeletal:  Negative for back pain.   Skin:  Negative for rash.   Neurological:  Negative for dizziness.   Psychiatric/Behavioral:  Negative for agitation.        Objective:     /76 (BP Location: Left arm, Patient Position: Sitting)   Pulse 64   Ht 5' 3" (1.6 m)   Wt 60 kg (132 lb 4.4 oz)   LMP  (LMP Unknown) Comment: postmenopausal  BMI 23.43 kg/m²     Physical Exam  Constitutional:       General: She is not in acute distress.  Pulmonary:      Effort: Pulmonary effort is normal.   Abdominal:      General: There is no distension.      Palpations: Abdomen is soft.   Genitourinary:     Comments: Urethral lopez catheter is in place draining yellow colored urine.  Neurological:      Mental Status: She is alert and oriented to person, place, and time.         No visits with results within 1 Day(s) from this visit.   Latest known visit with results is:   Lab Visit on 03/06/2024   Component Date Value Ref Range Status    WBC 03/06/2024 4.96  3.90 - 12.70 K/uL Final    RBC 03/06/2024 4.11  4.00 - 5.40 M/uL Final    Hemoglobin 03/06/2024 12.6  12.0 - 16.0 g/dL Final    Hematocrit 03/06/2024 38.7  37.0 - 48.5 % Final    MCV 03/06/2024 94  82 - 98 fL Final    MCH 03/06/2024 30.7  27.0 - 31.0 pg Final    MCHC 03/06/2024 32.6  32.0 - 36.0 g/dL Final    RDW 03/06/2024 12.9  11.5 - 14.5 % Final    Platelets 03/06/2024 228  150 - 450 K/uL Final    MPV 03/06/2024 10.1  9.2 - 12.9 fL Final    Immature Granulocytes 03/06/2024 0.2  0.0 - 0.5 % Final    " Gran # (ANC) 03/06/2024 2.6  1.8 - 7.7 K/uL Final    Immature Grans (Abs) 03/06/2024 0.01  0.00 - 0.04 K/uL Final    Comment: Mild elevation in immature granulocytes is non specific and   can be seen in a variety of conditions including stress response,   acute inflammation, trauma and pregnancy. Correlation with other   laboratory and clinical findings is essential.      Lymph # 03/06/2024 1.4  1.0 - 4.8 K/uL Final    Mono # 03/06/2024 0.6  0.3 - 1.0 K/uL Final    Eos # 03/06/2024 0.3  0.0 - 0.5 K/uL Final    Baso # 03/06/2024 0.04  0.00 - 0.20 K/uL Final    nRBC 03/06/2024 0  0 /100 WBC Final    Gran % 03/06/2024 52.7  38.0 - 73.0 % Final    Lymph % 03/06/2024 28.6  18.0 - 48.0 % Final    Mono % 03/06/2024 11.9  4.0 - 15.0 % Final    Eosinophil % 03/06/2024 5.8  0.0 - 8.0 % Final    Basophil % 03/06/2024 0.8  0.0 - 1.9 % Final    Differential Method 03/06/2024 Automated   Final    Sodium 03/06/2024 145  136 - 145 mmol/L Final    Potassium 03/06/2024 3.8  3.5 - 5.1 mmol/L Final    Chloride 03/06/2024 106  95 - 110 mmol/L Final    CO2 03/06/2024 31 (H)  23 - 29 mmol/L Final    Glucose 03/06/2024 89  70 - 110 mg/dL Final    BUN 03/06/2024 21  8 - 23 mg/dL Final    Creatinine 03/06/2024 1.2  0.5 - 1.4 mg/dL Final    Calcium 03/06/2024 9.8  8.7 - 10.5 mg/dL Final    Anion Gap 03/06/2024 8  8 - 16 mmol/L Final    eGFR 03/06/2024 46.3 (A)  >60 mL/min/1.73 m^2 Final         3-6-24  Cr 1.2    10-17-23  Cr 1.12     10-17-23  GFR 47.0      I reviewed all of the above lab results.       Assessment:       1. Acute urinary retention      Plan:           Assessment:  - Acute urinary retention after her right ureteral stent exchange yesterday on 3-19-24.  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  She is undergoing chronic right ureteral stent exchanges.  Her most recent right ureteral stent exchange was on 3-19-24.  - History of urinary retention in October  2023.    Plan:  - Park catheter removal this morning for a voiding trial.  The patient says she lives very close to the office.  I told her to come back to the office prior to 5 pm today if he is having difficulty voiding or can't void or to go to the ER if necessary.  - I recommended that she try to avoid taking hydrocodone or other narcotic pain medication and hyoscyamine at this time until she is voiding without difficulty.  - I discussed dietary modifications with her today and I recommended she drink mostly water during the day.   - KUB and renal ultrasound in 3 weeks followed by an office visit with a PVR on arrival or follow up soon as needed.

## 2024-04-08 ENCOUNTER — HOSPITAL ENCOUNTER (OUTPATIENT)
Dept: RADIOLOGY | Facility: HOSPITAL | Age: 79
Discharge: HOME | End: 2024-04-08
Attending: UROLOGY
Payer: MEDICARE

## 2024-04-08 DIAGNOSIS — N13.39 OTHER HYDRONEPHROSIS: ICD-10-CM

## 2024-04-08 PROCEDURE — 76770 US EXAM ABDO BACK WALL COMP: CPT | Mod: 26,,, | Performed by: STUDENT IN AN ORGANIZED HEALTH CARE EDUCATION/TRAINING PROGRAM

## 2024-04-08 PROCEDURE — 76770 US EXAM ABDO BACK WALL COMP: CPT | Mod: TC

## 2024-04-08 PROCEDURE — 74018 RADEX ABDOMEN 1 VIEW: CPT | Mod: 26,,, | Performed by: RADIOLOGY

## 2024-04-08 PROCEDURE — 74018 RADEX ABDOMEN 1 VIEW: CPT | Mod: TC

## 2024-04-10 ENCOUNTER — OFFICE VISIT (OUTPATIENT)
Dept: UROLOGY | Facility: CLINIC | Age: 79
End: 2024-04-10
Payer: MEDICARE

## 2024-04-10 VITALS
SYSTOLIC BLOOD PRESSURE: 122 MMHG | WEIGHT: 132.25 LBS | BODY MASS INDEX: 23.43 KG/M2 | DIASTOLIC BLOOD PRESSURE: 63 MMHG | HEART RATE: 56 BPM | HEIGHT: 63 IN

## 2024-04-10 DIAGNOSIS — N18.31 CHRONIC KIDNEY DISEASE (CKD) STAGE G3A/A1, MODERATELY DECREASED GLOMERULAR FILTRATION RATE (GFR) BETWEEN 45-59 ML/MIN/1.73 SQUARE METER AND ALBUMINURIA CREATININE RATIO LESS THAN 30 MG/G: ICD-10-CM

## 2024-04-10 DIAGNOSIS — Z85.528 PERSONAL HISTORY OF KIDNEY CANCER: ICD-10-CM

## 2024-04-10 DIAGNOSIS — N13.39 OTHER HYDRONEPHROSIS: ICD-10-CM

## 2024-04-10 DIAGNOSIS — R33.9 INCOMPLETE BLADDER EMPTYING: Primary | ICD-10-CM

## 2024-04-10 LAB
BILIRUB UR QL STRIP: NEGATIVE
GLUCOSE UR QL STRIP: NEGATIVE
KETONES UR QL STRIP: NEGATIVE
LEUKOCYTE ESTERASE UR QL STRIP: POSITIVE
PH, POC UA: 7
POC BLOOD, URINE: POSITIVE
POC NITRATES, URINE: NEGATIVE
POC RESIDUAL URINE VOLUME: 135 ML (ref 0–100)
PROT UR QL STRIP: POSITIVE
SP GR UR STRIP: 1.01 (ref 1–1.03)
UROBILINOGEN UR STRIP-ACNC: 0.2 (ref 0.1–1.1)

## 2024-04-10 PROCEDURE — 1126F AMNT PAIN NOTED NONE PRSNT: CPT | Mod: CPTII,S$GLB,, | Performed by: UROLOGY

## 2024-04-10 PROCEDURE — 1101F PT FALLS ASSESS-DOCD LE1/YR: CPT | Mod: CPTII,S$GLB,, | Performed by: UROLOGY

## 2024-04-10 PROCEDURE — 81003 URINALYSIS AUTO W/O SCOPE: CPT | Mod: QW,S$GLB,, | Performed by: UROLOGY

## 2024-04-10 PROCEDURE — 3074F SYST BP LT 130 MM HG: CPT | Mod: CPTII,S$GLB,, | Performed by: UROLOGY

## 2024-04-10 PROCEDURE — 99999 PR PBB SHADOW E&M-EST. PATIENT-LVL IV: CPT | Mod: PBBFAC,,, | Performed by: UROLOGY

## 2024-04-10 PROCEDURE — 1159F MED LIST DOCD IN RCRD: CPT | Mod: CPTII,S$GLB,, | Performed by: UROLOGY

## 2024-04-10 PROCEDURE — 3288F FALL RISK ASSESSMENT DOCD: CPT | Mod: CPTII,S$GLB,, | Performed by: UROLOGY

## 2024-04-10 PROCEDURE — 3078F DIAST BP <80 MM HG: CPT | Mod: CPTII,S$GLB,, | Performed by: UROLOGY

## 2024-04-10 PROCEDURE — 99214 OFFICE O/P EST MOD 30 MIN: CPT | Mod: S$GLB,,, | Performed by: UROLOGY

## 2024-04-10 PROCEDURE — 51798 US URINE CAPACITY MEASURE: CPT | Mod: S$GLB,,, | Performed by: UROLOGY

## 2024-04-10 RX ORDER — TAMSULOSIN HYDROCHLORIDE 0.4 MG/1
0.4 CAPSULE ORAL DAILY
Qty: 90 CAPSULE | Refills: 1 | Status: SHIPPED | OUTPATIENT
Start: 2024-04-10 | End: 2025-04-10

## 2024-04-10 NOTE — PROGRESS NOTES
Subjective:       Patient ID: Lovely Madsen is a 78 y.o. female.    Chief Complaint: No chief complaint on file.      History of Present Illness:     Ms Madsen has right acquired ureteral obstruction with RPF and she is undergoing right ureteral stent exchanges.  Her most recent right ureteral stent exchange was on 3-19-24.  She went into urinary retention on 3-19-24 and was sent home with a lopez catheter in place.  Her lopez catheter was removed on 3-20-24 and she was able to pass her voiding trial.  She has incomplete bladder emptying.  PVR today on 4-10-24 is 135 ml.  She has a history of OAB and was on oxybutynin in the past.  She says she is no longer taking oxybutynin.  Denies flank pain.  No gross hematuria.           Past Medical History:   Diagnosis Date    Abnormal EKG 03/08/2024    Acute cystitis without hematuria 02/17/2022    First seen 02/17/2022, Last seen 09/19/2023    Cyst of kidney, acquired 05/20/2016    First seen 05/20/2016, Last seen 12/14/2022    Dysuria 09/19/2023    History of kidney cancer 2018    Hydronephrosis with ureteral stricture, not elsewhere classified 11/08/2022    Kidney stone 05/25/2022    Malignant neoplasm of left kidney, except renal pelvis 2016    Neoplasm of uncertain behavior of left kidney 2016    Other hydronephrosis 03/04/2020    First seen 03/04/2020, Last seen 09/19/2023    Other retention of urine 12/16/2022    Overactive bladder 09/03/2019    First seen 09/03/2019, Last seen 09/19/2023    Preop cardiovascular exam 03/08/2024    Stage 3a chronic kidney disease 03/08/2024     Family History   Problem Relation Age of Onset    Alzheimer's disease Father      Social History     Socioeconomic History    Marital status:    Tobacco Use    Smoking status: Never    Smokeless tobacco: Never   Substance and Sexual Activity    Alcohol use: Not Currently    Drug use: Never     Outpatient Encounter Medications as of 4/10/2024   Medication Sig Dispense Refill     bisoprolol (ZEBETA) 5 MG tablet Take 5 mg by mouth once daily.      cetirizine (ZYRTEC) 10 MG tablet Take 10 mg by mouth.      clobetasoL (TEMOVATE) 0.05 % external solution Apply nightly as needed for itching or rash to scalp      colestipoL (COLESTID) 5 gram Pack MIX THE CONTENTS OF 1 PACKET IN LIQUID AS DIRECTED AND DRINK THREE TIMES DAILY      cyanocobalamin 500 MCG tablet Take by mouth.      donepeziL (ARICEPT) 10 MG tablet Take 10 mg by mouth.      EScitalopram oxalate (LEXAPRO) 10 MG tablet Take 10 mg by mouth.      folic acid (FOLVITE) 1 MG tablet Take 1 mg by mouth once daily.      gabapentin (NEURONTIN) 300 MG capsule Take 300 mg by mouth 3 (three) times daily.      hydroCHLOROthiazide (HYDRODIURIL) 12.5 MG Tab Take 12.5 mg by mouth.      HYDROcodone-acetaminophen (NORCO) 5-325 mg per tablet Take 1 tablet by mouth every 6 (six) hours as needed for Pain (Take 1 by mouth every 6 hours as needed for pain). 20 tablet 0    hyoscyamine (LEVSIN) 0.125 mg Subl Place 1 tablet (0.125 mg total) under the tongue every 6 (six) hours as needed (Take 1 sublingual every 6 hours PRN bladder spasms). 30 tablet 6    ketoconazole (NIZORAL) 2 % shampoo Apply topically twice a week.      memantine (NAMENDA) 10 MG Tab Take 10 mg by mouth 2 (two) times daily.      memantine (NAMENDA) 5 MG Tab Take 5 mg by mouth.      multivit-min-ferrous sulfate (ONE DAILY MULTI-VIT W-MINERAL) 4.5 mg iron Tab Take by mouth.      omeprazole (PRILOSEC) 40 MG capsule Take 40 mg by mouth.      paroxetine (PAXIL) 30 MG tablet Take 30 mg by mouth every morning.      potassium bicarbonate (K-LYTE) disintegrating tablet Take 25 mEq by mouth.      pravastatin (PRAVACHOL) 40 MG tablet Take 40 mg by mouth once daily.      tamsulosin (FLOMAX) 0.4 mg Cap Take 0.4 mg by mouth.      tamsulosin (FLOMAX) 0.4 mg Cap Take 1 capsule (0.4 mg total) by mouth once daily. 90 capsule 1     No facility-administered encounter medications on file as of 4/10/2024.     "    Review of Systems   Constitutional:  Negative for chills and fever.   Respiratory:  Negative for shortness of breath.    Cardiovascular:  Negative for chest pain.   Gastrointestinal:  Negative for nausea and vomiting.   Genitourinary:  Negative for dysuria, flank pain, frequency, hematuria and urgency.   Musculoskeletal:  Negative for back pain.   Skin:  Negative for rash.   Neurological:  Negative for dizziness.   Psychiatric/Behavioral:  Negative for agitation.        Objective:     /63 (BP Location: Left arm, Patient Position: Sitting)   Pulse (!) 56   Ht 5' 3" (1.6 m)   Wt 60 kg (132 lb 4.4 oz)   LMP  (LMP Unknown) Comment: postmenopausal  BMI 23.43 kg/m²     Physical Exam  Constitutional:       General: She is not in acute distress.  Pulmonary:      Effort: Pulmonary effort is normal.   Abdominal:      General: There is no distension.      Palpations: Abdomen is soft.   Neurological:      Mental Status: She is alert and oriented to person, place, and time.         Office Visit on 04/10/2024   Component Date Value Ref Range Status    POC Residual Urine Volume 04/10/2024 135 (A)  0 - 100 mL Corrected    POC Blood, Urine 04/10/2024 Positive (A)  Negative Final    POC Bilirubin, Urine 04/10/2024 Negative  Negative Final    POC Urobilinogen, Urine 04/10/2024 0.2  0.1 - 1.1 Final    POC Ketones, Urine 04/10/2024 Negative  Negative Final    POC Protein, Urine 04/10/2024 Positive (A)  Negative Final    POC Nitrates, Urine 04/10/2024 Negative  Negative Final    POC Glucose, Urine 04/10/2024 Negative  Negative Final    pH, UA 04/10/2024 7.0   Final    POC Specific Gravity, Urine 04/10/2024 1.015  1.003 - 1.029 Final    POC Leukocytes, Urine 04/10/2024 Positive (A)  Negative Final        Results for orders placed or performed in visit on 04/10/24 (from the past 8760 hour(s))   POCT Bladder Scan   Result Value    POC Residual Urine Volume 135 (A)          3-6-24  Cr 1.2     10-17-23  Cr 1.12     10-17-23  " GFR 47.0        I reviewed all of the above lab results.         5-27-22  CT abdomen/pelvis without IV contrast.  MALLORY.  See report.  Status post appropriate placement of a double J ureteral stent since the previous CT with significant improvement in the right sided hydronephrosis and hydroureter.  No obvious renal or ureteral calculus.  Tiny diverticulum along the left anterior bladder wall.  Status post previous left partial nephrectomy with no recurrent left renal mass.      4-8-24  Renal ultrasound.  See report.  Post surgical change of partial left nephrectomy. Right ureteral stent present. No hydronephrosis.     4-8-24  KUB.  A right-sided ureteral stent is in place. No calcification seen along the course of the stent. There is a calcification seen projecting over the left side of the abdomen that is rounded and measures approximately 10 mm. It is possible that this could represent a density within the bowel as this was not seen on the prior study from 03/06/2024.     5-24-23  MAG 3 renal scan with lasix.  BRG.  See report.  Differential renal function:  Left 47%  Right 53%.  T1/2 max of the left kidney is 12 minutes.  T1/2 max of the right kidney is 28 minutes.    1-20-22  MAG 3 renal scan with lasix.  BRG.  See report.    I reviewed all of the above imaging results.       Assessment:       1. Incomplete bladder emptying    2. Other hydronephrosis    3. Personal history of kidney cancer    4. Chronic kidney disease (CKD) stage G3a/A1, moderately decreased glomerular filtration rate (GFR) between 45-59 mL/min/1.73 square meter and albuminuria creatinine ratio less than 30 mg/g      Plan:     Orders Placed This Encounter    US Retroperitoneal Complete    X-Ray Abdomen AP 1 View    POCT Bladder Scan    POCT Urinalysis, Dipstick, Automated, W/O Scope    tamsulosin (FLOMAX) 0.4 mg Cap        Assessment:  - Incomplete bladder emptying.  PVR today on 4-10-24 ws 135 ml.  - Acute urinary retention after her right  ureteral stent exchange on 3-19-24.  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  She is undergoing chronic right ureteral stent exchanges.  Her most recent right ureteral stent exchange was on 3-19-24.  - History of urinary retention in October 2023.  - History of a right diagnostic ureteroscopy with right ureteral stent placement on 2-4-22.  S/p cystoscopy with right ureteral stent was exchanged on 6-13-22.  - CKD.  - History of a 2.6 cm left posterior midpole renal mass s/p left robotic partial nephrectomy by me on 7-18-26.  Path:  Papillary renal cell carcinoma, grade 2, margins uninvolved, pT1aNX.  - History of colon cancer in 2001 s/p resection, XRT, and chemotherapy.     Plan:  - Started tamsulosin 0.4 mg 1 PO qdaily.  - Timed and double voiding.  - I discussed dietary modifications with her today and I recommended she drink mostly water during the day.   - Renal ultrasound and KUB both in 4 months a few days prior to her 4 month appointment with a PVR on arrival or follow up sooner as needed.

## 2024-08-08 ENCOUNTER — HOSPITAL ENCOUNTER (OUTPATIENT)
Dept: RADIOLOGY | Facility: HOSPITAL | Age: 79
Discharge: HOME | End: 2024-08-08
Attending: UROLOGY
Payer: MEDICARE

## 2024-08-08 DIAGNOSIS — N13.39 OTHER HYDRONEPHROSIS: ICD-10-CM

## 2024-08-08 PROCEDURE — 74018 RADEX ABDOMEN 1 VIEW: CPT | Mod: TC

## 2024-08-08 PROCEDURE — 76770 US EXAM ABDO BACK WALL COMP: CPT | Mod: TC

## 2024-08-08 PROCEDURE — 74018 RADEX ABDOMEN 1 VIEW: CPT | Mod: 26,,, | Performed by: RADIOLOGY

## 2024-08-08 PROCEDURE — 76770 US EXAM ABDO BACK WALL COMP: CPT | Mod: 26,,, | Performed by: RADIOLOGY

## 2024-08-14 ENCOUNTER — OFFICE VISIT (OUTPATIENT)
Dept: UROLOGY | Facility: CLINIC | Age: 79
End: 2024-08-14
Payer: MEDICARE

## 2024-08-14 ENCOUNTER — HOSPITAL ENCOUNTER (OUTPATIENT)
Dept: RADIOLOGY | Facility: HOSPITAL | Age: 79
Discharge: HOME | End: 2024-08-14
Attending: UROLOGY
Payer: MEDICARE

## 2024-08-14 VITALS
SYSTOLIC BLOOD PRESSURE: 98 MMHG | HEART RATE: 63 BPM | DIASTOLIC BLOOD PRESSURE: 63 MMHG | WEIGHT: 132.25 LBS | BODY MASS INDEX: 23.43 KG/M2

## 2024-08-14 DIAGNOSIS — N13.39 OTHER HYDRONEPHROSIS: Primary | ICD-10-CM

## 2024-08-14 DIAGNOSIS — N30.00 ACUTE CYSTITIS WITHOUT HEMATURIA: ICD-10-CM

## 2024-08-14 DIAGNOSIS — Z01.818 PREOP TESTING: ICD-10-CM

## 2024-08-14 DIAGNOSIS — Z85.528 PERSONAL HISTORY OF RENAL CANCER: ICD-10-CM

## 2024-08-14 DIAGNOSIS — N18.31 CHRONIC KIDNEY DISEASE (CKD) STAGE G3A/A1, MODERATELY DECREASED GLOMERULAR FILTRATION RATE (GFR) BETWEEN 45-59 ML/MIN/1.73 SQUARE METER AND ALBUMINURIA CREATININE RATIO LESS THAN 30 MG/G: ICD-10-CM

## 2024-08-14 DIAGNOSIS — R30.0 DYSURIA: ICD-10-CM

## 2024-08-14 DIAGNOSIS — R39.14 FEELING OF INCOMPLETE BLADDER EMPTYING: ICD-10-CM

## 2024-08-14 LAB
BILIRUB UR QL STRIP: NEGATIVE
GLUCOSE UR QL STRIP: NEGATIVE
KETONES UR QL STRIP: NEGATIVE
LEUKOCYTE ESTERASE UR QL STRIP: POSITIVE
PH, POC UA: 7.5
POC BLOOD, URINE: POSITIVE
POC NITRATES, URINE: NEGATIVE
PROT UR QL STRIP: POSITIVE
SP GR UR STRIP: 1.01 (ref 1–1.03)
UROBILINOGEN UR STRIP-ACNC: 0.2 (ref 0.1–1.1)

## 2024-08-14 PROCEDURE — 1126F AMNT PAIN NOTED NONE PRSNT: CPT | Mod: CPTII,S$GLB,, | Performed by: UROLOGY

## 2024-08-14 PROCEDURE — 99999 PR PBB SHADOW E&M-EST. PATIENT-LVL V: CPT | Mod: PBBFAC,,, | Performed by: UROLOGY

## 2024-08-14 PROCEDURE — 3074F SYST BP LT 130 MM HG: CPT | Mod: CPTII,S$GLB,, | Performed by: UROLOGY

## 2024-08-14 PROCEDURE — 81003 URINALYSIS AUTO W/O SCOPE: CPT | Mod: QW,S$GLB,, | Performed by: UROLOGY

## 2024-08-14 PROCEDURE — 87086 URINE CULTURE/COLONY COUNT: CPT | Performed by: UROLOGY

## 2024-08-14 PROCEDURE — 1101F PT FALLS ASSESS-DOCD LE1/YR: CPT | Mod: CPTII,S$GLB,, | Performed by: UROLOGY

## 2024-08-14 PROCEDURE — 71046 X-RAY EXAM CHEST 2 VIEWS: CPT | Mod: 26,,, | Performed by: RADIOLOGY

## 2024-08-14 PROCEDURE — 3288F FALL RISK ASSESSMENT DOCD: CPT | Mod: CPTII,S$GLB,, | Performed by: UROLOGY

## 2024-08-14 PROCEDURE — 1159F MED LIST DOCD IN RCRD: CPT | Mod: CPTII,S$GLB,, | Performed by: UROLOGY

## 2024-08-14 PROCEDURE — 71046 X-RAY EXAM CHEST 2 VIEWS: CPT | Mod: TC

## 2024-08-14 PROCEDURE — 3078F DIAST BP <80 MM HG: CPT | Mod: CPTII,S$GLB,, | Performed by: UROLOGY

## 2024-08-14 PROCEDURE — 99215 OFFICE O/P EST HI 40 MIN: CPT | Mod: S$GLB,,, | Performed by: UROLOGY

## 2024-08-14 RX ORDER — TAMSULOSIN HYDROCHLORIDE 0.4 MG/1
0.4 CAPSULE ORAL DAILY
Qty: 90 CAPSULE | Refills: 3 | Status: SHIPPED | OUTPATIENT
Start: 2024-08-14 | End: 2025-08-14

## 2024-08-14 RX ORDER — AMOXICILLIN AND CLAVULANATE POTASSIUM 875; 125 MG/1; MG/1
1 TABLET, FILM COATED ORAL 2 TIMES DAILY
Qty: 14 TABLET | Refills: 0 | Status: SHIPPED | OUTPATIENT
Start: 2024-08-14

## 2024-08-14 NOTE — H&P (VIEW-ONLY)
Subjective:       Patient ID: Lovely Madsen is a 79 y.o. female.    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  She is undergoing chronic right ureteral stent exchanges.  Her most recent right ureteral stent exchange was on 3-19-24.  She has a history of a right diagnostic ureteroscopy with right ureteral stent placement on 2-4-22.  S/p cystoscopy with right ureteral stent was exchanged on 6-13-22.    She has mild intermittent dysuria.  Her incomplete bladder emptying that has improved with tamsulosin.  PVR today on 8-14-24 is 0 m.  PVR on 4-10-24 ws 135 ml.  She had acute urinary retention after her right ureteral stent exchange on 3-19-24.  She has a history of urinary retention in October 2023.         Past Medical History:   Diagnosis Date    Abnormal EKG 03/08/2024    Acute cystitis without hematuria 02/17/2022    First seen 02/17/2022, Last seen 09/19/2023    Cyst of kidney, acquired 05/20/2016    First seen 05/20/2016, Last seen 12/14/2022    Dysuria 09/19/2023    History of kidney cancer 2018    Hydronephrosis with ureteral stricture, not elsewhere classified 11/08/2022    Kidney stone 05/25/2022    Malignant neoplasm of left kidney, except renal pelvis 2016    Neoplasm of uncertain behavior of left kidney 2016    Other hydronephrosis 03/04/2020    First seen 03/04/2020, Last seen 09/19/2023    Other retention of urine 12/16/2022    Overactive bladder 09/03/2019    First seen 09/03/2019, Last seen 09/19/2023    Preop cardiovascular exam 03/08/2024    Stage 3a chronic kidney disease 03/08/2024     Family History   Problem Relation Name Age of Onset    Alzheimer's disease Father       Social History     Socioeconomic History    Marital status:    Tobacco Use    Smoking status: Never    Smokeless tobacco: Never   Substance and Sexual Activity    Alcohol  use: Not Currently    Drug use: Never     Outpatient Encounter Medications as of 8/14/2024   Medication Sig Dispense Refill    bisoprolol (ZEBETA) 5 MG tablet Take 5 mg by mouth once daily.      cetirizine (ZYRTEC) 10 MG tablet Take 10 mg by mouth.      colestipoL (COLESTID) 5 gram Pack MIX THE CONTENTS OF 1 PACKET IN LIQUID AS DIRECTED AND DRINK THREE TIMES DAILY      cyanocobalamin 500 MCG tablet Take by mouth.      donepeziL (ARICEPT) 10 MG tablet Take 10 mg by mouth.      EScitalopram oxalate (LEXAPRO) 10 MG tablet Take 10 mg by mouth.      folic acid (FOLVITE) 1 MG tablet Take 1 mg by mouth once daily.      gabapentin (NEURONTIN) 300 MG capsule Take 300 mg by mouth 3 (three) times daily.      hydroCHLOROthiazide (HYDRODIURIL) 12.5 MG Tab Take 12.5 mg by mouth.      HYDROcodone-acetaminophen (NORCO) 5-325 mg per tablet Take 1 tablet by mouth every 6 (six) hours as needed for Pain (Take 1 by mouth every 6 hours as needed for pain). 20 tablet 0    hyoscyamine (LEVSIN) 0.125 mg Subl Place 1 tablet (0.125 mg total) under the tongue every 6 (six) hours as needed (Take 1 sublingual every 6 hours PRN bladder spasms). 30 tablet 6    ketoconazole (NIZORAL) 2 % shampoo Apply topically twice a week.      memantine (NAMENDA) 10 MG Tab Take 10 mg by mouth 2 (two) times daily.      memantine (NAMENDA) 5 MG Tab Take 5 mg by mouth.      multivit-min-ferrous sulfate (ONE DAILY MULTI-VIT W-MINERAL) 4.5 mg iron Tab Take by mouth.      omeprazole (PRILOSEC) 40 MG capsule Take 40 mg by mouth.      paroxetine (PAXIL) 30 MG tablet Take 30 mg by mouth every morning.      potassium bicarbonate (K-LYTE) disintegrating tablet Take 25 mEq by mouth.      pravastatin (PRAVACHOL) 40 MG tablet Take 40 mg by mouth once daily.      tamsulosin (FLOMAX) 0.4 mg Cap Take 0.4 mg by mouth.      tamsulosin (FLOMAX) 0.4 mg Cap Take 1 capsule (0.4 mg total) by mouth once daily. 90 capsule 1    amoxicillin-clavulanate 875-125mg (AUGMENTIN) 875-125 mg per  tablet Take 1 tablet by mouth 2 (two) times daily. 14 tablet 0    clobetasoL (TEMOVATE) 0.05 % external solution Apply nightly as needed for itching or rash to scalp      tamsulosin (FLOMAX) 0.4 mg Cap Take 1 capsule (0.4 mg total) by mouth once daily. 90 capsule 3     No facility-administered encounter medications on file as of 8/14/2024.        Review of Systems   Constitutional:  Negative for chills and fever.   Respiratory:  Negative for shortness of breath.    Cardiovascular:  Negative for chest pain.   Gastrointestinal:  Negative for nausea and vomiting.   Genitourinary:  Positive for dysuria. Negative for difficulty urinating, flank pain and hematuria.   Musculoskeletal:  Negative for back pain.   Skin:  Negative for rash.   Neurological:  Negative for dizziness.   Psychiatric/Behavioral:  Negative for agitation.        Objective:     BP 98/63   Pulse 63   Wt 60 kg (132 lb 4.4 oz)   BMI 23.43 kg/m²     Physical Exam  Constitutional:       General: She is not in acute distress.  Cardiovascular:      Rate and Rhythm: Normal rate.   Pulmonary:      Effort: Pulmonary effort is normal.   Abdominal:      General: There is no distension.      Palpations: Abdomen is soft.   Neurological:      Mental Status: She is alert and oriented to person, place, and time.         Office Visit on 08/14/2024   Component Date Value Ref Range Status    POC Blood, Urine 08/14/2024 Positive (A)  Negative Final    POC Bilirubin, Urine 08/14/2024 Negative  Negative Final    POC Urobilinogen, Urine 08/14/2024 0.2  0.1 - 1.1 Final    POC Ketones, Urine 08/14/2024 Negative (A)  Negative Final    POC Protein, Urine 08/14/2024 Positive (A)  Negative Final    POC Nitrates, Urine 08/14/2024 Negative  Negative Final    POC Glucose, Urine 08/14/2024 Negative  Negative Final    pH, UA 08/14/2024 7.5   Final    POC Specific Gravity, Urine 08/14/2024 1.015  1.003 - 1.029 Final    POC Leukocytes, Urine 08/14/2024 Positive (A)  Negative Final         Results for orders placed or performed in visit on 04/10/24 (from the past 8760 hour(s))   POCT Bladder Scan   Result Value    POC Residual Urine Volume 135 (A)        Assessment:       1. Other hydronephrosis    2. Dysuria    3. Acute cystitis without hematuria    4. Feeling of incomplete bladder emptying    5. Preop testing    6. Chronic kidney disease (CKD) stage G3a/A1, moderately decreased glomerular filtration rate (GFR) between 45-59 mL/min/1.73 square meter and albuminuria creatinine ratio less than 30 mg/g    7. Personal history of renal cancer      Plan:     Orders Placed This Encounter    CULTURE, URINE    X-Ray Chest PA And Lateral    CBC Auto Differential    Comprehensive Metabolic Panel    POCT Urinalysis, Dipstick, Automated, W/O Scope    EKG 12-lead    amoxicillin-clavulanate 875-125mg (AUGMENTIN) 875-125 mg per tablet    tamsulosin (FLOMAX) 0.4 mg Cap             5-27-22  CT abdomen/pelvis without IV contrast.  MALLORY.  See report.  Status post appropriate placement of a double J ureteral stent since the previous CT with significant improvement in the right sided hydronephrosis and hydroureter.  No obvious renal or ureteral calculus.  Tiny diverticulum along the left anterior bladder wall.  Status post previous left partial nephrectomy with no recurrent left renal mass.      8-8-24  Renal ultrasound.  See report.  Right kidney measures 10.4 cm in greatest length and the only focal abnormality is an upper pole 3 mm echogenic focus consistent with nonobstructive nephrolithiasis. Renal stent is in place. No hydronephrosis.  Left kidney has no focal nor diffuse abnormalities and no evidence for hydronephrosis.      4-8-24  Renal ultrasound.  See report.  Post surgical change of partial left nephrectomy. Right ureteral stent present. No hydronephrosis.     8-8-24  KUB.  See report.  A right-sided ureteral stent is in place.Calcified phleboliths seen projecting over the left hemipelvis.      4-8-24  KUB.   A right-sided ureteral stent is in place. No calcification seen along the course of the stent. There is a calcification seen projecting over the left side of the abdomen that is rounded and measures approximately 10 mm. It is possible that this could represent a density within the bowel as this was not seen on the prior study from 03/06/2024.      5-24-23  MAG 3 renal scan with lasix.  BRG.  See report.  Differential renal function:  Left 47%  Right 53%.  T1/2 max of the left kidney is 12 minutes.  T1/2 max of the right kidney is 28 minutes.     1-20-22  MAG 3 renal scan with lasix.  BRG.  See report.     I reviewed all of the above imaging results.          3-6-24  Cr 1.2     10-17-23  Cr 1.12.  GFR 47.0.    I reviewed all of the above lab results.           Assessment:  - Chronic right hydronephrosis with right acquired ureteral obstruction secondary to retroperitoneal fibrosis.  She is undergoing chronic right ureteral stent exchanges.  Her most recent right ureteral stent exchange was on 3-19-24.  - History of a right diagnostic ureteroscopy with right ureteral stent placement on 2-4-22.  S/p cystoscopy with right ureteral stent was exchanged on 6-13-22.  - Cystitis.  - Mild intermittent dysuria.  - Incomplete bladder emptying that has improved with tamsulosin.  PVR today on 8-14-24 is 0 m.  PVR on 4-10-24 ws 135 ml.  - Acute urinary retention after her right ureteral stent exchange on 3-19-24.  - History of urinary retention in October 2023.  - CKD.  - History of a 2.6 cm left posterior midpole renal mass s/p left robotic partial nephrectomy by me on 7-18-16.  Path:  Papillary renal cell carcinoma, grade 2, margins uninvolved, pT1aNX.  - History of colon cancer in 2001 s/p resection, XRT, and chemotherapy.     Plan:  - I discussed options with the patient today and the mutual decision was made to proceed to the OR for a cystoscopy, right retrograde pyelogram, and right ureteral stent exchange.  Risks and  benefits of the surgery were explained to the patient and the patient expressed understanding.  All questions were answered by me to the patient's apparent understanding and to the patient's apparent satisfaction.  Surgery consent was signed today by the patient.   - Urine culture today.  - Started augmentin 875 mg 1 PO BID X 7 days.  - Refilled tamsulosin 0.4 mg 1 PO qdaily.  - Continue with timed and double voiding.  - I discussed dietary modifications with her today and I recommended she drink mostly water during the day.

## 2024-08-16 ENCOUNTER — TELEPHONE (OUTPATIENT)
Dept: UROLOGY | Facility: CLINIC | Age: 79
End: 2024-08-16
Payer: MEDICARE

## 2024-08-16 ENCOUNTER — HOSPITAL ENCOUNTER (OUTPATIENT)
Dept: CARDIOLOGY | Facility: HOSPITAL | Age: 79
Discharge: HOME | End: 2024-08-16
Attending: UROLOGY
Payer: MEDICARE

## 2024-08-16 DIAGNOSIS — Z01.818 PRE-OP TESTING: Primary | ICD-10-CM

## 2024-08-16 DIAGNOSIS — Z01.818 PRE-OP TESTING: ICD-10-CM

## 2024-08-16 LAB
BACTERIA UR CULT: NORMAL
BACTERIA UR CULT: NORMAL
OHS QRS DURATION: 68 MS
OHS QTC CALCULATION: 423 MS

## 2024-08-16 PROCEDURE — 93010 ELECTROCARDIOGRAM REPORT: CPT | Mod: ,,, | Performed by: INTERNAL MEDICINE

## 2024-08-16 PROCEDURE — 93005 ELECTROCARDIOGRAM TRACING: CPT

## 2024-08-16 NOTE — TELEPHONE ENCOUNTER
EKG scheduled, patient notified through phone call.           ----- Message from Elba Pena sent at 8/16/2024 10:02 AM CDT -----  Contact:    .Type: Patient Call Back        Who called:  Low CARTER ()   What is the request in detail:    Patient needs new orders put in for EKG so patient can get procedure. Please call back   Can the clinic reply by YUESNER?           Would the patient rather a call back or a response via [facility]?      call   Best call back number:  [phone]

## 2024-09-05 ENCOUNTER — TELEPHONE (OUTPATIENT)
Dept: PREADMISSION TESTING | Facility: HOSPITAL | Age: 79
End: 2024-09-05
Payer: MEDICARE

## 2024-09-05 ENCOUNTER — OFFICE VISIT (OUTPATIENT)
Dept: INTERNAL MEDICINE | Facility: CLINIC | Age: 79
End: 2024-09-05
Payer: MEDICARE

## 2024-09-05 VITALS
HEART RATE: 66 BPM | SYSTOLIC BLOOD PRESSURE: 104 MMHG | OXYGEN SATURATION: 97 % | RESPIRATION RATE: 18 BRPM | DIASTOLIC BLOOD PRESSURE: 54 MMHG | TEMPERATURE: 97 F

## 2024-09-05 DIAGNOSIS — N13.39 OTHER HYDRONEPHROSIS: Primary | ICD-10-CM

## 2024-09-05 DIAGNOSIS — Z01.818 PREOP TESTING: ICD-10-CM

## 2024-09-05 DIAGNOSIS — F03.B0 MODERATE DEMENTIA WITHOUT BEHAVIORAL DISTURBANCE: ICD-10-CM

## 2024-09-05 DIAGNOSIS — I10 PRIMARY HYPERTENSION: ICD-10-CM

## 2024-09-05 DIAGNOSIS — K21.9 GASTROESOPHAGEAL REFLUX DISEASE, UNSPECIFIED WHETHER ESOPHAGITIS PRESENT: ICD-10-CM

## 2024-09-05 DIAGNOSIS — G62.9 NEUROPATHY: ICD-10-CM

## 2024-09-05 DIAGNOSIS — N18.31 STAGE 3A CHRONIC KIDNEY DISEASE: ICD-10-CM

## 2024-09-05 DIAGNOSIS — I70.0 AORTIC ATHEROSCLEROSIS: ICD-10-CM

## 2024-09-05 DIAGNOSIS — F32.0 MILD MAJOR DEPRESSION: ICD-10-CM

## 2024-09-05 DIAGNOSIS — R00.1 BRADYCARDIA: ICD-10-CM

## 2024-09-05 DIAGNOSIS — E78.00 PURE HYPERCHOLESTEROLEMIA: ICD-10-CM

## 2024-09-05 PROCEDURE — 99999 PR PBB SHADOW E&M-EST. PATIENT-LVL IV: CPT | Mod: PBBFAC,,,

## 2024-09-05 NOTE — PROGRESS NOTES
Preoperative History and Physical  [facility]                                                                   Chief Complaint: Preoperative evaluation     History of Present Illness:      Lovely Madsen is a 79 y.o. female who presents to the office today for a preoperative consultation at the request of Dr. Bee who plans on performing a CYSTOSCOPY, WITH RETROGRADE PYELOGRAM AND URETERAL STENT INSERTION (RIGHT) on September 10.     Functional Status:      The patient is able to climb a flight of stairs. The patient is able to ambulate independently without difficulty. The patient's functional status is affected by the surgical problem. The patient's functional status is not affected by shortness of breath, chest pain, dyspnea on exertion and fatigue.    MET score greater than 4    Patient Anesthesia History:      History of Malignant Hyperthermia: no  History of Pseudocholinesterase Deficiency: no  History of PONV: no  History of difficult intubation: no  History of delayed emergence: no  History of high fever after anesthesia: no    Family Anesthesia History:      History of Malignant Hyperthermia: no  History of Pseudocholinesterase Deficiency: no    Past Medical History:      Past Medical History:   Diagnosis Date    Abnormal EKG 03/08/2024    Acute cystitis without hematuria 02/17/2022    First seen 02/17/2022, Last seen 09/19/2023    Cyst of kidney, acquired 05/20/2016    First seen 05/20/2016, Last seen 12/14/2022    Dysuria 09/19/2023    History of kidney cancer 2018    Hydronephrosis with ureteral stricture, not elsewhere classified 11/08/2022    Hyperlipidemia     Hypertension     Kidney stone 05/25/2022    Malignant neoplasm of left kidney, except renal pelvis 2016    Neoplasm of uncertain behavior of left kidney 2016    Other hydronephrosis 03/04/2020    First seen 03/04/2020, Last seen 09/19/2023    Other retention of urine 12/16/2022     Overactive bladder 09/03/2019    First seen 09/03/2019, Last seen 09/19/2023    Preop cardiovascular exam 03/08/2024    Stage 3a chronic kidney disease 03/08/2024        Past Surgical History:      Past Surgical History:   Procedure Laterality Date    COLON SURGERY      CYSTOSCOPY WITH URETEROSCOPY, RETROGRADE PYELOGRAPHY, AND INSERTION OF STENT Right 3/19/2024    Procedure: CYSTOSCOPY, WITH RETROGRADE PYELOGRAM AND URETERAL STENT INSERTION;  Surgeon: Wallace Bee MD;  Location: [facility];  Service: Urology;  Laterality: Right;    PARTIAL NEPHRECTOMY Left 07/18/2016    Left robotic partial nephrectomy        Social History:      Social History     Socioeconomic History    Marital status:    Tobacco Use    Smoking status: Never    Smokeless tobacco: Never   Substance and Sexual Activity    Alcohol use: Not Currently    Drug use: Never        Family History:      Family History   Problem Relation Name Age of Onset    Cancer Father      Alzheimer's disease Father         Allergies:      Review of patient's allergies indicates:  No Known Allergies    Medications:      Current Outpatient Medications   Medication Sig    amoxicillin-clavulanate 875-125mg (AUGMENTIN) 875-125 mg per tablet Take 1 tablet by mouth 2 (two) times daily.    bisoprolol (ZEBETA) 5 MG tablet Take 5 mg by mouth once daily.    cetirizine (ZYRTEC) 10 MG tablet Take 10 mg by mouth.    clobetasoL (TEMOVATE) 0.05 % external solution Apply nightly as needed for itching or rash to scalp    colestipoL (COLESTID) 5 gram Pack MIX THE CONTENTS OF 1 PACKET IN LIQUID AS DIRECTED AND DRINK THREE TIMES DAILY    cyanocobalamin 500 MCG tablet Take by mouth.    donepeziL (ARICEPT) 10 MG tablet Take 10 mg by mouth.    EScitalopram oxalate (LEXAPRO) 10 MG tablet Take 10 mg by mouth.    folic acid (FOLVITE) 1 MG tablet Take 1 mg by mouth once daily.    gabapentin (NEURONTIN) 300 MG capsule Take 300 mg by mouth 3 (three) times daily.    hydroCHLOROthiazide  (HYDRODIURIL) 12.5 MG Tab Take 12.5 mg by mouth.    hyoscyamine (LEVSIN) 0.125 mg Subl Place 1 tablet (0.125 mg total) under the tongue every 6 (six) hours as needed (Take 1 sublingual every 6 hours PRN bladder spasms).    ketoconazole (NIZORAL) 2 % shampoo Apply topically twice a week.    memantine (NAMENDA) 10 MG Tab Take 10 mg by mouth 2 (two) times daily.    multivit-min-ferrous sulfate (ONE DAILY MULTI-VIT W-MINERAL) 4.5 mg iron Tab Take by mouth.    omeprazole (PRILOSEC) 40 MG capsule Take 40 mg by mouth.    paroxetine (PAXIL) 30 MG tablet Take 30 mg by mouth every morning.    potassium bicarbonate (K-LYTE) disintegrating tablet Take 25 mEq by mouth.    pravastatin (PRAVACHOL) 40 MG tablet Take 40 mg by mouth once daily.    tamsulosin (FLOMAX) 0.4 mg Cap Take 1 capsule (0.4 mg total) by mouth once daily.     No current facility-administered medications for this visit.       Vitals:      Vitals:    09/05/24 1232   BP: (!) 104/54   Pulse:    Resp:    Temp:        Review of Systems:        Constitutional: Negative for fever, chills, weight loss, malaise/fatigue and diaphoresis.   HENT: Negative for hearing loss, ear pain, nosebleeds, congestion, sore throat, neck pain, tinnitus and ear discharge.    Eyes: Negative for blurred vision, double vision, photophobia, pain, discharge and redness.   Respiratory: Negative for cough, hemoptysis, sputum production, shortness of breath, wheezing and stridor.    Cardiovascular: Negative for chest pain, palpitations, orthopnea, claudication, leg swelling and PND.   Gastrointestinal: Negative for heartburn, nausea, vomiting, abdominal pain, diarrhea, constipation, blood in stool and melena.   Genitourinary: Negative for urgency, frequency, hematuria and flank pain. + dysuria (improved)  Musculoskeletal: Negative for myalgias, back pain, joint pain and falls.   Skin: Negative for itching and rash.   Neurological: Negative for dizziness, tingling, tremors, sensory change, speech  change, focal weakness, seizures, loss of consciousness, weakness and headaches.   Endo/Heme/Allergies: Negative for environmental allergies and polydipsia. Does not bruise/bleed easily.   Psychiatric/Behavioral: Negative for depression, suicidal ideas, hallucinations, memory loss and substance abuse. The patient is not nervous/anxious and does not have insomnia.    All 14 systems reviewed and negative except as noted above.    Physical Exam:      Constitutional: Appears well-developed, well-nourished and in no acute distress.  Patient is oriented to person, place, and time.   Head: Normocephalic and atraumatic. Mucous membranes moist.  Neck: Neck supple no mass.   Cardiovascular: Normal rate and regular rhythm.  S1 S2 appreciated by ascultation.  Pulmonary/Chest: Effort normal and clear to auscultation bilaterally. No respiratory distress.   Abdomen: Soft. Non-tender and non-distended. Bowel sounds are normal.   Neurological: Patient is alert and oriented to person, place and time. Moves all extremities.  Skin: Warm and dry. No lesions.  Extremities: No clubbing, cyanosis or edema.    Laboratory data:      Reviewed and noted in plan where applicable. Please see chart for full laboratory data.    Lab Results   Component Value Date    WBC 6.27 08/14/2024    HGB 11.8 (L) 08/14/2024    HCT 36.5 (L) 08/14/2024    MCV 95 08/14/2024     08/14/2024       Comprehensive Metabolic Panel  Order: [id number]  Status: Final result  Dx: Preop testing          Component Ref Range & Units 3 wk ago 6 mo ago   Sodium 136 - 145 mmol/L 144 145   Potassium 3.5 - 5.1 mmol/L 3.9 3.8   Chloride 95 - 110 mmol/L 105 106   CO2 23 - 29 mmol/L 30 High  31 High    Glucose 70 - 110 mg/dL 89 89   BUN 8 - 23 mg/dL 19 21   Creatinine 0.5 - 1.4 mg/dL 1.4 1.2   Calcium 8.7 - 10.5 mg/dL 9.2 9.8   Total Protein 6.0 - 8.4 g/dL 6.5    Albumin 3.5 - 5.2 g/dL 3.4 Low     Total Bilirubin 0.1 - 1.0 mg/dL 0.4    Comment: For infants and newborns,  interpretation of results should be based  on gestational age, weight and in agreement with clinical  observations.    Premature Infant recommended reference ranges:  Up to 24 hours.............<8.0 mg/dL  Up to 48 hours............<12.0 mg/dL  3-5 days..................<15.0 mg/dL  6-29 days.................<15.0 mg/dL   Alkaline Phosphatase 55 - 135 U/L 123    AST 10 - 40 U/L 21    ALT 10 - 44 U/L 13    eGFR >60 mL/min/1.73 m^2 38.3 Abnormal  46.3 Abnormal    Anion Gap 8 - 16 mmol/L 9 8   Resulting Agency  [facility]           Specimen Collected: 08/14/24 08:58 CDT Last Resulted: 08/14/24 14:56 CDT         Predictors of intubation difficulty:       Morbid obesity? no   Anatomically abnormal facies? no   Prominent incisors? no   Receding mandible? no   Short, thick neck? no   Neck range of motion: normal   Dentition: No chipped, loose, or missing teeth.  Based on the Modified Mallampati, patient is a mallampati score: II (hard and soft palate, upper portion of tonsils anduvula visible)    Cardiographics:      ECG: Normal sinus rhythm   Normal ECG   When compared with ECG of 06-MAR-2024 10:30,   No significant change was found   Confirmed by PEBBLES YUNG MD ([id number]) on 8/16/2024 5:48:53 PM     Echocardiogram: not indicated    Imaging:      Chest x-ray: The lungs are clear and free of infiltrate.  No pleural effusion or pneumothorax. The heart is not enlarged. There is tortuosity of the descending thoracic aorta with calcification of the aortic knob. No acute cardiopulmonary process per imaging on 08/14/2024.    Assessment and Plan:      1. Other hydronephrosis  Assessment & Plan:  CYSTOSCOPY, WITH RETROGRADE PYELOGRAM AND URETERAL STENT INSERTION (RIGHT) planned per Dr. Bee on 9/10/2024     Known risk factors for perioperative complications: None    Difficulty with intubation is not anticipated.    Cardiac Risk Estimation: Based on the Revised Cardiac Risk index, patient is a Class I risk with a 3.9 % risk of a  major cardiac event in a low risk procedure.    1.) Preoperative workup as follows: chest x-ray, ECG, hemoglobin, hematocrit, electrolytes, creatinine, glucose.  2.) Change in medication regimen before surgery: discontinue ASA 6 days before surgery, discontinue NSAIDs 5 days before surgery, hold Metformin 24 hours prior to surgery. Hold all vitamins/supplements for 7 days prior to surgery, with the exception of Potassium and Iron supplementation. Hold semaglutide/tirzepatide for 7 days prior to surgery.   3.) Prophylaxis for cardiac events with perioperative beta-blockers:  Bisoprolol continued.  4.) Invasive hemodynamic monitoring perioperatively: at the discretion of anesthesiologist.  5.) Deep vein thrombosis prophylaxis postoperatively: regimen to be chosen by surgical team.  6.) Surveillance for postoperative MI with ECG immediately postoperatively and on postoperati ve days 1 and 2 AND troponin levels 24 hours postoperatively and on day 4 or hospital discharge (whichever comes first): at the discretion of anesthesiologist.  7.) Current medications which may produce withdrawal symptoms if withheld perioperatively: None  8.) Other measures: Postoperative hypertension management with IV hydralazine until able to take oral medications.  Postoperative incentive spirometry to prevent pneumonia.  Medication dosing adjusted for renal insufficiency as needed    --Hold ASA, NSAIDs and all vitamins/supplements 7 days prior to procedure  --Hold metformin 24 hours prior to surgery     --Augmentin x1 week completed   -Levsin as needed for bladder spasms- whole morning of surgery  -tamsulosin continued- take morning of surgery      2. Preop testing  -     Ambulatory referral/consult to Pre-Admit    3. Primary hypertension  Assessment & Plan:  -blood pressure 104/54 upon assessment in [facility]  -bisoprolol continued- may take morning of surgery  -patient advised to hold HCTZ if having episodes of diarrhea  -patient also advised to  hold HCTZ morning of surgery      4. Moderate dementia without behavioral disturbance  Assessment & Plan:  -patient accompanied to PA visit by   -Aricept continued nightly  -Namenda twice daily continued- take morning of surgery  -patient advised to hold all vitamins and supplements for 7 days prior to procedure  -followed outpatient by Neurology      5. Pure hypercholesterolemia  Assessment & Plan:  -pravastatin continued      6. Mild major depression  Assessment & Plan:  -Lexapro continued- take morning of surgery      7. Aortic atherosclerosis  Assessment & Plan:  -pravastatin continued      8. Neuropathy  Assessment & Plan:  -Neurontin continued      9. Stage 3a chronic kidney disease  Assessment & Plan:  -in the setting of partial nephrectomy on 07/18/2016  -BUN/creatinine 19/1.4  -EGFR 38.3   -medication dosing adjusted for renal insufficiency as appropriate    10. Bradycardia  -stable  -EKG on 8/16/2024 showed Normal Sinus Rhythm- Normal ECG     11. Gastroesophageal reflux disease  -omeprazole continued - take morning of surgery        Electronically signed by Sophie Abdi NP on 9/6/2024 at 11:48 AM.

## 2024-09-05 NOTE — TELEPHONE ENCOUNTER
To confirm, your doctor has instructed you: Surgery is scheduled for 9/10/24.   Pre admit office will call the afternoon prior to surgery between 1PM and 3PM with arrival time.    Surgery will be at [facility],  The address is [address]. [place] [address].      IMPORTANT INSTRUCTIONS!    Do not eat or drink after 12 midnight, including water. Do not smoke or use chewing tobacco after 12 midnight!  OK to brush teeth, but no gum, candy, or mints!      *Take only these medicines with a small swallow of water-morning of surgery*     Prilosec, bisoprolol, lexapro, namenda, flomax         ____ Stop taking all vitamins, herbal supplements, Aspirin, & NSAIDS (Ibuprofen, Advil, Aleve) 7 days prior to surgery, as these can thin your blood.    ____ Weight loss medication, such as Adipex and Phentermine, must be stopped 14 days prior to surgery, no exceptions!    *Diabetic Patients: If you take diabetic or weight loss medication, do NOT take morning of surgery unless instructed by Doctor. Metformin to be stopped 24 hrs prior to surgery. DO NOT take short-acting insulin the day of surgery. Only take HALF of your regular dose of long-acting insulin the night before surgery, unless instructed otherwise. Blood sugars will be checked in pre-op.   ~Ozempic/Mounjaro/Wegovy/Trulicity/Semaglutide injections must be stopped 7 days prior to surgery.     Please notify MD office if you develop an active infection, are prescribed antibiotics by someone other than the surgeon doing your surgery, or visit urgent care/ER.      Bathing Instructions:   The night before surgery and the morning prior to coming to the hospital:    - Shower & rinse your body as usual with anti-bacterial Soap (Dial or Lever 2000)   -Hibiclens (if indicated) use AFTER anti-bacterial soap; 1 packet PM/1 packet in AM on surgical site only   -Do not use hibiclens on your head, face, or genitals.    -Do not wash with anti-bacterial soap after you  use the hibiclens.    -Do not shave surgical site 5-7 days prior to surgery.    -Pubic hair 7 days prior to surgery (OBGYN/Urology only).       Additional Instructions:   __ No makeup, powder, lotions, creams, or body spray to skin   __ No deodorant if you are having: breast procedure, PORT, or upper shoulder surgery!   __ No nail polish or artificial nails       **SURGERY WILL BE CANCELLED IF ARTIFICIAL/NAIL POLISH IS PRESENT!!!**  __ Please remove all piercings and leave all jewelry at home.    **SURGERY WILL BE CANCELLED IF PIERCINGS ARE PRESENT!!!**    __ Dentures, Hearing Aids and Contact Lens need to be removed prior to the start of surgery.    __ Avoid Alcoholic beverages 3 days prior to surgery, as it can thin the blood, unless told otherwise by pre-admit department.  __ Females: may need to give a urine sample the morning of surgery;   **Please ask  for a specimen cup if you need to use the restroom prior to being called into pre-op.**  __ Males: Stop ED medications (Viagra, Cialis) 24 hrs prior to surgery.  __ Wear clean, loose-fitting clothing. Allow for dressings/bandages/surgical equipment   __ You must have transportation, and they MUST stay the entire time.   __  Bring photo ID and insurance information to hospital [facility] Visitor/Ride Policy:   Only 1 adult allowed (over the age of 18) to accompany you and MUST STAY through the entire length of admission.     -Must have a ride home from a responsible adult that you know and trust.    -Medical Transport, Uber or Lyft can only be used if patient has a responsible adult to accompany them during ride home.    ~Your ride MUST STAY the entire time until you are discharged~    *If you are running late or have questions the morning of surgery, please call the Pre-OP Department @ [phone].       *Please Call [facility] Pre-Admit Department for surgery instruction questions:   [phone]       Financial Questions:                                                       Additional Payment Question Numbers:   qhel: [phone]                                                               [phone] or [phone]

## 2024-09-05 NOTE — DISCHARGE INSTRUCTIONS
To confirm, your doctor has instructed you: Surgery is scheduled for 9/10/24.   Pre admit office will call the afternoon prior to surgery between 1PM and 3PM with arrival time.    Surgery will be at [facility],  The address is [address]. [place] [address].      IMPORTANT INSTRUCTIONS!    Do not eat or drink after 12 midnight, including water. Do not smoke or use chewing tobacco after 12 midnight!  OK to brush teeth, but no gum, candy, or mints!      *Take only these medicines with a small swallow of water-morning of surgery*     Prilosec, bisoprolol, lexapro, namenda, flomax         ____ Stop taking all vitamins, herbal supplements, Aspirin, & NSAIDS (Ibuprofen, Advil, Aleve) 7 days prior to surgery, as these can thin your blood.    ____ Weight loss medication, such as Adipex and Phentermine, must be stopped 14 days prior to surgery, no exceptions!    *Diabetic Patients: If you take diabetic or weight loss medication, do NOT take morning of surgery unless instructed by Doctor. Metformin to be stopped 24 hrs prior to surgery. DO NOT take short-acting insulin the day of surgery. Only take HALF of your regular dose of long-acting insulin the night before surgery, unless instructed otherwise. Blood sugars will be checked in pre-op.   ~Ozempic/Mounjaro/Wegovy/Trulicity/Semaglutide injections must be stopped 7 days prior to surgery.     Please notify MD office if you develop an active infection, are prescribed antibiotics by someone other than the surgeon doing your surgery, or visit urgent care/ER.      Bathing Instructions:   The night before surgery and the morning prior to coming to the hospital:    - Shower & rinse your body as usual with anti-bacterial Soap (Dial or Lever 2000)   -Hibiclens (if indicated) use AFTER anti-bacterial soap; 1 packet PM/1 packet in AM on surgical site only   -Do not use hibiclens on your head, face, or genitals.    -Do not wash with anti-bacterial soap after you  use the hibiclens.    -Do not shave surgical site 5-7 days prior to surgery.    -Pubic hair 7 days prior to surgery (OBGYN/Urology only).       Additional Instructions:   __ No makeup, powder, lotions, creams, or body spray to skin   __ No deodorant if you are having: breast procedure, PORT, or upper shoulder surgery!   __ No nail polish or artificial nails       **SURGERY WILL BE CANCELLED IF ARTIFICIAL/NAIL POLISH IS PRESENT!!!**  __ Please remove all piercings and leave all jewelry at home.    **SURGERY WILL BE CANCELLED IF PIERCINGS ARE PRESENT!!!**    __ Dentures, Hearing Aids and Contact Lens need to be removed prior to the start of surgery.    __ Avoid Alcoholic beverages 3 days prior to surgery, as it can thin the blood, unless told otherwise by pre-admit department.  __ Females: may need to give a urine sample the morning of surgery;   **Please ask  for a specimen cup if you need to use the restroom prior to being called into pre-op.**  __ Males: Stop ED medications (Viagra, Cialis) 24 hrs prior to surgery.  __ Wear clean, loose-fitting clothing. Allow for dressings/bandages/surgical equipment   __ You must have transportation, and they MUST stay the entire time.   __  Bring photo ID and insurance information to hospital [facility] Visitor/Ride Policy:   Only 1 adult allowed (over the age of 18) to accompany you and MUST STAY through the entire length of admission.     -Must have a ride home from a responsible adult that you know and trust.    -Medical Transport, Uber or Lyft can only be used if patient has a responsible adult to accompany them during ride home.    ~Your ride MUST STAY the entire time until you are discharged~    *If you are running late or have questions the morning of surgery, please call the Pre-OP Department @ [phone].       *Please Call [facility] Pre-Admit Department for surgery instruction questions:   [phone]       Financial Questions:                                                       Additional Payment Question Numbers:   qhel: [phone]                                                               [phone] or [phone]

## 2024-09-05 NOTE — ASSESSMENT & PLAN NOTE
CYSTOSCOPY, WITH RETROGRADE PYELOGRAM AND URETERAL STENT INSERTION (RIGHT) planned per Dr. Bee on 9/10/2024     Known risk factors for perioperative complications: None    Difficulty with intubation is not anticipated.    Cardiac Risk Estimation: Based on the Revised Cardiac Risk index, patient is a Class I risk with a 3.9 % risk of a major cardiac event in a low risk procedure.    1.) Preoperative workup as follows: chest x-ray, ECG, hemoglobin, hematocrit, electrolytes, creatinine, glucose.  2.) Change in medication regimen before surgery: discontinue ASA 6 days before surgery, discontinue NSAIDs 5 days before surgery, hold Metformin 24 hours prior to surgery. Hold all vitamins/supplements for 7 days prior to surgery, with the exception of Potassium and Iron supplementation. Hold semaglutide/tirzepatide for 7 days prior to surgery.   3.) Prophylaxis for cardiac events with perioperative beta-blockers:  Bisoprolol continued.  4.) Invasive hemodynamic monitoring perioperatively: at the discretion of anesthesiologist.  5.) Deep vein thrombosis prophylaxis postoperatively: regimen to be chosen by surgical team.  6.) Surveillance for postoperative MI with ECG immediately postoperatively and on postoperati ve days 1 and 2 AND troponin levels 24 hours postoperatively and on day 4 or hospital discharge (whichever comes first): at the discretion of anesthesiologist.  7.) Current medications which may produce withdrawal symptoms if withheld perioperatively: None  8.) Other measures: Postoperative hypertension management with IV hydralazine until able to take oral medications.  Postoperative incentive spirometry to prevent pneumonia.  Medication dosing adjusted for renal insufficiency as needed    --Hold ASA, NSAIDs and all vitamins/supplements 7 days prior to procedure  --Hold metformin 24 hours prior to surgery     --Augmentin x1 week completed   -Levsin as needed for bladder spasms- whole morning of surgery

## 2024-09-06 PROBLEM — F03.B0 MODERATE DEMENTIA WITHOUT BEHAVIORAL DISTURBANCE: Status: ACTIVE | Noted: 2024-09-06

## 2024-09-06 PROBLEM — F32.0 MILD MAJOR DEPRESSION: Status: ACTIVE | Noted: 2024-09-06

## 2024-09-06 PROBLEM — G62.9 NEUROPATHY: Status: ACTIVE | Noted: 2024-09-06

## 2024-09-06 PROBLEM — K21.9 GASTROESOPHAGEAL REFLUX DISEASE: Status: ACTIVE | Noted: 2024-09-06

## 2024-09-06 PROBLEM — R19.7 DIARRHEA: Status: ACTIVE | Noted: 2024-09-06

## 2024-09-06 NOTE — ASSESSMENT & PLAN NOTE
-patient accompanied to PA visit by   -Aricept continued nightly  -Namenda twice daily continued- take morning of surgery  -patient advised to hold all vitamins and supplements for 7 days prior to procedure  -followed outpatient by Neurology

## 2024-09-06 NOTE — ASSESSMENT & PLAN NOTE
-blood pressure 104/54 upon assessment in PAT  -bisoprolol continued- may take morning of surgery  -patient advised to hold HCTZ if having episodes of diarrhea  -patient also advised to hold HCTZ morning of surgery

## 2024-09-06 NOTE — ASSESSMENT & PLAN NOTE
-chronic  -C diff negative on 08/26/2024   -lactoferrin negative on 09/04/2024  -colestipol frequency increased  -followed outpatient by GI at [facility]

## 2024-09-06 NOTE — ASSESSMENT & PLAN NOTE
-in the setting of partial nephrectomy on 7/18/2016  -BUN/creatinine 19/1.4  -EGFR 38.3   -medication dosing adjusted for renal insufficiency as appropriate

## 2024-09-09 ENCOUNTER — ANESTHESIA EVENT (OUTPATIENT)
Dept: SURGERY | Facility: HOSPITAL | Age: 79
End: 2024-09-09
Payer: MEDICARE

## 2024-09-09 NOTE — PLAN OF CARE
Called and spoke with the patient about the following:      Your Surgery arrival time is at 5:30am on 9/10/24 at [facility].   The address is [address]. [place] [address].       *If you are running late or have questions the morning of surgery, please call the Pre-OP Department @ [phone].     [facility] Visitor/Ride Policy:   Adults:  Only 1 adult allowed (must be 18 or older) to accompany you and MUST STAY through the entire length of admission.     -Must have a ride home from a responsible adult that you know and trust.    -Medical Transport, Uber or Lyft can only be used if patient has a responsible adult to accompany them during ride home.    Pediatrics:  Pediatric patients are encouraged to have 2 adults (must be 18 or older) accompany them to the surgery center.   ~If the parent/legal guardian is unable to stay for the duration of the surgery time,   you MUST have someone over the age of 18 able to stay with the patient until time of discharge.     ~The parent/legal guardian must be available to be reached by phone at all times if they are unable to stay with the patient.       You may be required to provide a urine sample prior to procedure;   Please ask  for a specimen cup if you need to use the restroom prior to being called into pre-op.     Please come to the main lobby and be prepared to show your photo ID and insurance card.       Nothing to eat or drink after midnight, unless you were instructed to take specific medications discussed with the Pre-admit Nurse.    Please call with any questions or concerns.      [phone]       Thanks.

## 2024-09-09 NOTE — ANESTHESIA PREPROCEDURE EVALUATION
09/09/2024  Lovely Madsen is a 79 y.o., female.  Past Medical History:   Diagnosis Date    Abnormal EKG 03/08/2024    Acute cystitis without hematuria 02/17/2022    First seen 02/17/2022, Last seen 09/19/2023    Cyst of kidney, acquired 05/20/2016    First seen 05/20/2016, Last seen 12/14/2022    Dysuria 09/19/2023    History of kidney cancer 2018    Hydronephrosis with ureteral stricture, not elsewhere classified 11/08/2022    Hyperlipidemia     Hypertension     Kidney stone 05/25/2022    Malignant neoplasm of left kidney, except renal pelvis 2016    Neoplasm of uncertain behavior of left kidney 2016    Other hydronephrosis 03/04/2020    First seen 03/04/2020, Last seen 09/19/2023    Other retention of urine 12/16/2022    Overactive bladder 09/03/2019    First seen 09/03/2019, Last seen 09/19/2023    Preop cardiovascular exam 03/08/2024    S/P cystoscopy with ureteral stent placement     Stage 3a chronic kidney disease 03/08/2024     Past Surgical History:   Procedure Laterality Date    COLON SURGERY      CYSTOSCOPY W/ URETERAL STENT PLACEMENT  06/13/2022    CYSTOSCOPY WITH URETEROSCOPY, RETROGRADE PYELOGRAPHY, AND INSERTION OF STENT Right 03/19/2024    Procedure: CYSTOSCOPY, WITH RETROGRADE PYELOGRAM AND URETERAL STENT INSERTION;  Surgeon: Wallace Bee MD;  Location: [facility];  Service: Urology;  Laterality: Right;    CYSTOSCOPY WITH URETEROSCOPY, RETROGRADE PYELOGRAPHY, AND INSERTION OF STENT  02/04/2022    PARTIAL NEPHRECTOMY Left 07/18/2016    Left robotic partial nephrectomy         Pre-op Assessment    I have reviewed the Patient Summary Reports.    I have reviewed the NPO Status.   I have reviewed the Medications.     Review of Systems  Anesthesia Hx:   History of prior surgery of interest to airway management or planning:            Denies Personal Hx of Anesthesia complications.                     Hematology/Oncology:                        --  Cancer in past history:                     EENT/Dental:  chronic allergic rhinitis           Cardiovascular:     Hypertension           hyperlipidemia                       Hypertension         Renal/:  Chronic Renal Disease, CKD renal calculi       Kidney Function/Disease             Hepatic/GI:     GERD             Psych:    depression                Physical Exam  General: Well nourished    Airway:  Mallampati: II   Mouth Opening: Normal  TM Distance: 4 - 6 cm  Tongue: Normal  Neck ROM: Normal ROM    Dental:  Intact        Anesthesia Plan  Type of Anesthesia, risks & benefits discussed:    Anesthesia Type: Gen ETT, Gen Supraglottic Airway, Gen Natural Airway, MAC  Intra-op Monitoring Plan: Standard ASA Monitors  Post Op Pain Control Plan: multimodal analgesia  Induction:  IV  Informed Consent: Informed consent signed with the Patient and all parties understand the risks and agree with anesthesia plan.  All questions answered. Patient consented to blood products? No  ASA Score: 3  Day of Surgery Review of History & Physical: H&P Update referred to the surgeon/provider.    Ready For Surgery From Anesthesia Perspective.     .

## 2024-09-10 ENCOUNTER — HOSPITAL ENCOUNTER (OUTPATIENT)
Dept: RADIOLOGY | Facility: HOSPITAL | Age: 79
Discharge: HOME | End: 2024-09-10
Attending: UROLOGY | Admitting: UROLOGY
Payer: MEDICARE

## 2024-09-10 ENCOUNTER — ANESTHESIA (OUTPATIENT)
Dept: SURGERY | Facility: HOSPITAL | Age: 79
End: 2024-09-10
Payer: MEDICARE

## 2024-09-10 ENCOUNTER — HOSPITAL ENCOUNTER (OUTPATIENT)
Facility: HOSPITAL | Age: 79
Discharge: HOME | End: 2024-09-10
Attending: UROLOGY | Admitting: UROLOGY
Payer: MEDICARE

## 2024-09-10 VITALS
BODY MASS INDEX: 21.28 KG/M2 | RESPIRATION RATE: 11 BRPM | SYSTOLIC BLOOD PRESSURE: 132 MMHG | OXYGEN SATURATION: 98 % | TEMPERATURE: 97 F | HEART RATE: 64 BPM | DIASTOLIC BLOOD PRESSURE: 61 MMHG | WEIGHT: 120.13 LBS

## 2024-09-10 DIAGNOSIS — N13.39 OTHER HYDRONEPHROSIS: ICD-10-CM

## 2024-09-10 DIAGNOSIS — D49.4 BLADDER NEOPLASM: Primary | ICD-10-CM

## 2024-09-10 DIAGNOSIS — N13.39 OTHER HYDRONEPHROSIS: Primary | ICD-10-CM

## 2024-09-10 PROCEDURE — 36000707: Performed by: UROLOGY

## 2024-09-10 PROCEDURE — 88305 TISSUE EXAM BY PATHOLOGIST: CPT | Performed by: STUDENT IN AN ORGANIZED HEALTH CARE EDUCATION/TRAINING PROGRAM

## 2024-09-10 PROCEDURE — 27200651 HC AIRWAY, LMA: Performed by: NURSE ANESTHETIST, CERTIFIED REGISTERED

## 2024-09-10 PROCEDURE — C1758 CATHETER, URETERAL: HCPCS | Performed by: UROLOGY

## 2024-09-10 PROCEDURE — 74018 RADEX ABDOMEN 1 VIEW: CPT | Mod: TC

## 2024-09-10 PROCEDURE — 25500020 PHARM REV CODE 255: Performed by: UROLOGY

## 2024-09-10 PROCEDURE — 74018 RADEX ABDOMEN 1 VIEW: CPT | Mod: 26,,, | Performed by: RADIOLOGY

## 2024-09-10 PROCEDURE — 63600175 PHARM REV CODE 636 W HCPCS: Performed by: NURSE ANESTHETIST, CERTIFIED REGISTERED

## 2024-09-10 PROCEDURE — C2617 STENT, NON-COR, TEM W/O DEL: HCPCS | Performed by: UROLOGY

## 2024-09-10 PROCEDURE — 63600175 PHARM REV CODE 636 W HCPCS: Performed by: UROLOGY

## 2024-09-10 PROCEDURE — 52204 CYSTOSCOPY W/BIOPSY(S): CPT | Mod: 59,51,, | Performed by: UROLOGY

## 2024-09-10 PROCEDURE — 25000003 PHARM REV CODE 250: Performed by: UROLOGY

## 2024-09-10 PROCEDURE — 37000009 HC ANESTHESIA EA ADD 15 MINS: Performed by: UROLOGY

## 2024-09-10 PROCEDURE — 36000706: Performed by: UROLOGY

## 2024-09-10 PROCEDURE — 52332 CYSTOSCOPY AND TREATMENT: CPT | Mod: RT,,, | Performed by: UROLOGY

## 2024-09-10 PROCEDURE — 37000008 HC ANESTHESIA 1ST 15 MINUTES: Performed by: UROLOGY

## 2024-09-10 PROCEDURE — 74420 UROGRAPHY RTRGR +-KUB: CPT | Mod: 26,,, | Performed by: UROLOGY

## 2024-09-10 PROCEDURE — C1769 GUIDE WIRE: HCPCS | Performed by: UROLOGY

## 2024-09-10 PROCEDURE — 71000033 HC RECOVERY, INTIAL HOUR: Performed by: UROLOGY

## 2024-09-10 PROCEDURE — 71000015 HC POSTOP RECOV 1ST HR: Performed by: UROLOGY

## 2024-09-10 PROCEDURE — 25000003 PHARM REV CODE 250: Performed by: NURSE ANESTHETIST, CERTIFIED REGISTERED

## 2024-09-10 DEVICE — STENT URETERAL UNIV 7FR 24CM: Type: IMPLANTABLE DEVICE | Site: URETER | Status: FUNCTIONAL

## 2024-09-10 RX ORDER — FENTANYL CITRATE 50 UG/ML
INJECTION, SOLUTION INTRAMUSCULAR; INTRAVENOUS
Status: DISCONTINUED | OUTPATIENT
Start: 2024-09-10 | End: 2024-09-10

## 2024-09-10 RX ORDER — GLUCAGON 1 MG
1 KIT INJECTION
Status: DISCONTINUED | OUTPATIENT
Start: 2024-09-10 | End: 2024-09-10 | Stop reason: HOSPADM

## 2024-09-10 RX ORDER — TAMSULOSIN HYDROCHLORIDE 0.4 MG/1
0.4 CAPSULE ORAL DAILY
Qty: 90 CAPSULE | Refills: 3 | Status: SHIPPED | OUTPATIENT
Start: 2024-09-10 | End: 2025-09-10

## 2024-09-10 RX ORDER — ONDANSETRON HYDROCHLORIDE 2 MG/ML
4 INJECTION, SOLUTION INTRAVENOUS DAILY PRN
Status: DISCONTINUED | OUTPATIENT
Start: 2024-09-10 | End: 2024-09-10 | Stop reason: HOSPADM

## 2024-09-10 RX ORDER — CEFTRIAXONE 1 G/1
INJECTION, POWDER, FOR SOLUTION INTRAMUSCULAR; INTRAVENOUS
Status: DISCONTINUED
Start: 2024-09-10 | End: 2024-09-10 | Stop reason: HOSPADM

## 2024-09-10 RX ORDER — ONDANSETRON HYDROCHLORIDE 2 MG/ML
INJECTION, SOLUTION INTRAVENOUS
Status: DISCONTINUED | OUTPATIENT
Start: 2024-09-10 | End: 2024-09-10

## 2024-09-10 RX ORDER — LIDOCAINE HYDROCHLORIDE 20 MG/ML
JELLY TOPICAL
Status: DISCONTINUED
Start: 2024-09-10 | End: 2024-09-10 | Stop reason: HOSPADM

## 2024-09-10 RX ORDER — DEXAMETHASONE SODIUM PHOSPHATE 4 MG/ML
INJECTION, SOLUTION INTRA-ARTICULAR; INTRALESIONAL; INTRAMUSCULAR; INTRAVENOUS; SOFT TISSUE
Status: DISCONTINUED | OUTPATIENT
Start: 2024-09-10 | End: 2024-09-10

## 2024-09-10 RX ORDER — LIDOCAINE HYDROCHLORIDE 20 MG/ML
JELLY TOPICAL
Status: DISCONTINUED | OUTPATIENT
Start: 2024-09-10 | End: 2024-09-10 | Stop reason: HOSPADM

## 2024-09-10 RX ORDER — FENTANYL CITRATE 50 UG/ML
25 INJECTION, SOLUTION INTRAMUSCULAR; INTRAVENOUS EVERY 5 MIN PRN
Status: DISCONTINUED | OUTPATIENT
Start: 2024-09-10 | End: 2024-09-10 | Stop reason: HOSPADM

## 2024-09-10 RX ORDER — SODIUM CHLORIDE, SODIUM LACTATE, POTASSIUM CHLORIDE, CALCIUM CHLORIDE 600; 310; 30; 20 MG/100ML; MG/100ML; MG/100ML; MG/100ML
INJECTION, SOLUTION INTRAVENOUS CONTINUOUS PRN
Status: DISCONTINUED | OUTPATIENT
Start: 2024-09-10 | End: 2024-09-10

## 2024-09-10 RX ORDER — PROPOFOL 10 MG/ML
INJECTION, EMULSION INTRAVENOUS
Status: DISCONTINUED | OUTPATIENT
Start: 2024-09-10 | End: 2024-09-10

## 2024-09-10 RX ORDER — HYDROCODONE BITARTRATE AND ACETAMINOPHEN 5; 325 MG/1; MG/1
1 TABLET ORAL EVERY 6 HOURS PRN
Qty: 20 TABLET | Refills: 0 | Status: SHIPPED | OUTPATIENT
Start: 2024-09-10

## 2024-09-10 RX ORDER — OXYCODONE AND ACETAMINOPHEN 5; 325 MG/1; MG/1
1 TABLET ORAL
Status: DISCONTINUED | OUTPATIENT
Start: 2024-09-10 | End: 2024-09-10 | Stop reason: HOSPADM

## 2024-09-10 RX ORDER — EPHEDRINE SULFATE 50 MG/ML
INJECTION, SOLUTION INTRAVENOUS
Status: DISCONTINUED | OUTPATIENT
Start: 2024-09-10 | End: 2024-09-10

## 2024-09-10 RX ORDER — MEPERIDINE HYDROCHLORIDE 25 MG/ML
12.5 INJECTION INTRAMUSCULAR; INTRAVENOUS; SUBCUTANEOUS ONCE AS NEEDED
Status: DISCONTINUED | OUTPATIENT
Start: 2024-09-10 | End: 2024-09-10 | Stop reason: HOSPADM

## 2024-09-10 RX ORDER — LIDOCAINE HYDROCHLORIDE 20 MG/ML
INJECTION INTRAVENOUS
Status: DISCONTINUED | OUTPATIENT
Start: 2024-09-10 | End: 2024-09-10

## 2024-09-10 RX ADMIN — FENTANYL CITRATE 25 MCG: 50 INJECTION, SOLUTION INTRAMUSCULAR; INTRAVENOUS at 07:09

## 2024-09-10 RX ADMIN — SODIUM CHLORIDE, POTASSIUM CHLORIDE, SODIUM LACTATE AND CALCIUM CHLORIDE: 600; 310; 30; 20 INJECTION, SOLUTION INTRAVENOUS at 06:09

## 2024-09-10 RX ADMIN — DEXAMETHASONE SODIUM PHOSPHATE 4 MG: 4 INJECTION, SOLUTION INTRA-ARTICULAR; INTRALESIONAL; INTRAMUSCULAR; INTRAVENOUS; SOFT TISSUE at 07:09

## 2024-09-10 RX ADMIN — PROPOFOL 25 MG: 10 INJECTION, EMULSION INTRAVENOUS at 07:09

## 2024-09-10 RX ADMIN — FENTANYL CITRATE 12.5 MCG: 50 INJECTION, SOLUTION INTRAMUSCULAR; INTRAVENOUS at 07:09

## 2024-09-10 RX ADMIN — CEFTRIAXONE SODIUM 1 G: 1 INJECTION, POWDER, FOR SOLUTION INTRAMUSCULAR; INTRAVENOUS at 07:09

## 2024-09-10 RX ADMIN — ONDANSETRON 4 MG: 2 INJECTION INTRAMUSCULAR; INTRAVENOUS at 07:09

## 2024-09-10 RX ADMIN — EPHEDRINE SULFATE 10 MG: 50 INJECTION INTRAVENOUS at 07:09

## 2024-09-10 RX ADMIN — LIDOCAINE HYDROCHLORIDE 50 MG: 20 INJECTION INTRAVENOUS at 07:09

## 2024-09-10 NOTE — ANESTHESIA POSTPROCEDURE EVALUATION
Anesthesia Post Evaluation    Patient: Lovely Madsen    Procedure(s) Performed: Procedure(s) (LRB):  CYSTOSCOPY, WITH RETROGRADE PYELOGRAM AND URETERAL STENT INSERTION (Right)    Final Anesthesia Type: general      Patient location during evaluation: PACU  Patient participation: Yes- Able to Participate  Level of consciousness: awake  Post-procedure vital signs: reviewed and stable  Pain management: adequate  Airway patency: patent    PONV status at discharge: No PONV  Anesthetic complications: no      Cardiovascular status: stable  Respiratory status: unassisted  Hydration status: euvolemic  Follow-up not needed.              Vitals Value Taken Time   /56 09/10/24 0835   Temp 36.3 °C (97.3 °F) 09/10/24 0800   Pulse 70 09/10/24 0838   Resp 12 09/10/24 0838   SpO2 97 % 09/10/24 0838   Vitals shown include unfiled device data.      No case tracking events are documented in the log.      Pain/Michelle Score: Michelle Score: 8 (9/10/2024  8:15 AM)

## 2024-09-10 NOTE — ANESTHESIA PROCEDURE NOTES
Intubation    Date/Time: 9/10/2024 7:11 AM    Performed by: Erna Lantigua CRNA  Authorized by: Erna Lantigua CRNA    Intubation:     Induction:  Intravenous    Intubated:  Postinduction    Mask Ventilation:  Easy mask    Attempts:  1    Attempted By:  CRNA    Difficult Airway Encountered?: No      Complications:  None    Airway Device:  Supraglottic airway/LMA    Airway Device Size:  3.0    Secured at:  The lips    Placement Verified By:  Capnometry    Complicating Factors:  None    Findings Post-Intubation:  BS equal bilateral and atraumatic/condition of teeth unchanged

## 2024-09-10 NOTE — OP NOTE
Surgeon:  Dr Wallace Bee.    Date:  9-.    Pre operative diagnosis:  1) Right hydronephrosis. 2) Right acquired ureteral obstruction.    Post operative diagnosis:  1) Right hydronephrosis.  2) Posterior bladder wall lesion.  3) Radiation cystitis.  4) Right acquired ureteral obstruction.    Surgery:  1) Cystoscopy with bladder biopsies with fulguration.  2) Cystoscopy, right retrograde pyelogram, and right ureteral stent exchange.     Anesthesia:  General.    Estimated blood loss:  2 ml.    Complications:  None.    Specimens:  Posterior bladder wall lesion for permanent specimen.    Procedure in details:  Risks and benefits of the surgery were explained to the patient prior to the surgery and the patient expressed understanding.  All questions were answered by me to the patient's apparent understanding and to the patient's apparent satisfaction.  Surgery consent was signed by the patient prior to the surgery.  The patient was taken to the OR and placed in the supine position initially.  Anesthesia was administered by the Anesthesia team.  The patient was  then placed in the dorsal lithotomy position.  The patient was prepped and drapped in the usual sterile fashion.  An IV antibiotic was given to the patient prior to the surgery.  A timeout was done prior to the surgery.  A 21 F rigid cystoscope was advanced through the urethra into her bladder.  The bladder was inspected in a systematic fashion.   There is an approximately 1 cm posterior bladder wall erythematous lesion that is concerning for a possible bladder neoplasm.  There is evidence of radiation cystitis changes throughout mostly the posterior wall of her bladder.  No other concerning bladder lesion is seen.  No bladder stone is seen.  The distal curl of her right ureteral stent is seen to be in good position within the lumen of her bladder.   A motion wire was advanced alongside her right ureteral stent through the lumen of his right ureter into her  right renal collecting system which was seen under fluoroscopy.  The cystoscope was then back loaded off of the motion wire.  The cystoscope was then advanced back through the urethra into her bladder.  A foreign body grasper was used to grab the distal curl of her right ureteral stent and then her entire right ureteral stent was removed from her body intact.  I advanced a cystoscope over the motion wire through the urethra into her bladder.  A 5 F open ended ureteral catheter was advanced over the motion wire into her right renal collecting system.  The motion wire was then removed from her body.  I then shot a gentle right retrograde pyelogram which showed moderate chronic right hydronephrosis with no filling defects and no contrast extravasation.  The motion wire was then passed back through the 5 F ureteral catheter into her right renal collecting system which was seen under fluoroscopy and the 5 F ureteral catheter was back loaded off of the wire.  The string was cut off of the ureteral stent.  I then advanced a 7 F by 24 cm ureteral stent over the sensor wire and used a pusher to push the distal curl of the ureteral stent into her bladder.  The proximal curl of her right ureteral stent was seen in good position within her right renal collecting system under fluoroscopy.  The distal curl of her right ureteral stent was seen in good position within the lumen of her bladder.  Final x-ray showed good position of her right ureteral stent.  I then used a biopsy forceps and took two bladder biopsies of her posterior bladder wall lesion.  I asked my nurse to send both bladder biopsies specimens to pathology for permanent specimen.  No further bladder wall lesion was seen at this.  I then used the bugbee to fulgurate her bladder at the bladder biopsies site.  I then turned down the flow and there was no bleeding seen.  I then placed a 20 F urethral lopez catheter through the urethra into her bladder.  When yellow  colored urine drained from her bladder, I inflated the lopez catheter balloon with 10 cc of sterile water.  I manually irrigated her lopez catheter with 50 cc of sterile water and her urine was clear and her catheter irrigated easily.  A large drainage bag was attached to her lopez catheter.  A post operative timeout was done at the end of the procedure.  The patient was taken to the recovery room in stable condition at the end of the procedure.

## 2024-09-10 NOTE — TRANSFER OF CARE
Anesthesia Transfer of Care Note    Patient: Lovely Arguetaphill    Procedure(s) Performed: Procedure(s) (LRB):  CYSTOSCOPY, WITH RETROGRADE PYELOGRAM AND URETERAL STENT INSERTION (Right)    Patient location: PACU    Anesthesia Type: general    Transport from OR: Transported from OR on room air with adequate spontaneous ventilation    Post pain: adequate analgesia    Post assessment: no apparent anesthetic complications    Post vital signs: stable    Level of consciousness: sedated    Nausea/Vomiting: no nausea/vomiting    Complications: none    Transfer of care protocol was followed      Last vitals: Visit Vitals  /71 (BP Location: Right arm, Patient Position: Sitting)   Pulse 63   Temp 37 °C (98.6 °F) (Temporal)   Resp 18   Wt 54.5 kg (120 lb 2.4 oz)   LMP  (LMP Unknown)   SpO2 100%   Breastfeeding No   BMI 21.28 kg/m²

## 2024-09-13 ENCOUNTER — CLINICAL SUPPORT (OUTPATIENT)
Dept: UROLOGY | Facility: CLINIC | Age: 79
End: 2024-09-13
Payer: MEDICARE

## 2024-09-13 DIAGNOSIS — N13.39 OTHER HYDRONEPHROSIS: Primary | ICD-10-CM

## 2024-09-13 NOTE — PROGRESS NOTES
Patient came in for a nurse visit today for a catheter removal per Dr Bee after a stent placement procedure on 9/10.  Patient had 120 ml of pinkish urine draining to bag. 10 mL removed from balloon, instructed patient to take a deep breath and catheter was removed. Patient tolerated well. Patient instructed to drink plenty of fluids. I went over after care instructions with the patient. She was informed that if she was unable to urinate she can either go to the office in [place] where Dr Bee will be and they can put in a catheter there. Other option given was, she can go to the ER if she was unable to urinate and it  was after hours. Pt stated they were instructed by Dr Bee to text him after leaving today to inform him that the catheter had been removed. Address to the [place] location was given to the patient and her spouse in case they need to see Dr Bee in [place] today.

## 2024-09-17 LAB
FINAL PATHOLOGIC DIAGNOSIS: NORMAL
GROSS: NORMAL
Lab: NORMAL

## 2024-09-30 ENCOUNTER — HOSPITAL ENCOUNTER (OUTPATIENT)
Dept: RADIOLOGY | Facility: HOSPITAL | Age: 79
Discharge: HOME | End: 2024-09-30
Attending: UROLOGY
Payer: MEDICARE

## 2024-09-30 DIAGNOSIS — N13.39 OTHER HYDRONEPHROSIS: ICD-10-CM

## 2024-09-30 PROCEDURE — 76770 US EXAM ABDO BACK WALL COMP: CPT | Mod: TC

## 2024-09-30 PROCEDURE — 76770 US EXAM ABDO BACK WALL COMP: CPT | Mod: 26,,, | Performed by: RADIOLOGY

## 2024-09-30 PROCEDURE — 74018 RADEX ABDOMEN 1 VIEW: CPT | Mod: TC

## 2024-09-30 PROCEDURE — 74018 RADEX ABDOMEN 1 VIEW: CPT | Mod: 26,,, | Performed by: RADIOLOGY

## 2024-10-02 ENCOUNTER — TELEPHONE (OUTPATIENT)
Dept: UROLOGY | Facility: CLINIC | Age: 79
End: 2024-10-02

## 2024-10-02 ENCOUNTER — TELEPHONE (OUTPATIENT)
Dept: UROLOGY | Facility: CLINIC | Age: 79
End: 2024-10-02
Payer: MEDICARE

## 2024-10-02 ENCOUNTER — OFFICE VISIT (OUTPATIENT)
Dept: UROLOGY | Facility: CLINIC | Age: 79
End: 2024-10-02
Payer: MEDICARE

## 2024-10-02 VITALS
WEIGHT: 117.31 LBS | BODY MASS INDEX: 20.79 KG/M2 | RESPIRATION RATE: 16 BRPM | HEART RATE: 61 BPM | HEIGHT: 63 IN | DIASTOLIC BLOOD PRESSURE: 74 MMHG | SYSTOLIC BLOOD PRESSURE: 115 MMHG

## 2024-10-02 DIAGNOSIS — D09.0: Primary | ICD-10-CM

## 2024-10-02 DIAGNOSIS — R39.14 FEELING OF INCOMPLETE BLADDER EMPTYING: ICD-10-CM

## 2024-10-02 DIAGNOSIS — N13.39 OTHER HYDRONEPHROSIS: ICD-10-CM

## 2024-10-02 DIAGNOSIS — N30.00 ACUTE CYSTITIS WITHOUT HEMATURIA: ICD-10-CM

## 2024-10-02 LAB
BILIRUB UR QL STRIP: NEGATIVE
GLUCOSE UR QL STRIP: NEGATIVE
KETONES UR QL STRIP: NEGATIVE
LEUKOCYTE ESTERASE UR QL STRIP: POSITIVE
PH, POC UA: 7
POC BLOOD, URINE: POSITIVE
POC NITRATES, URINE: POSITIVE
PROT UR QL STRIP: POSITIVE
SP GR UR STRIP: 1.01 (ref 1–1.03)
UROBILINOGEN UR STRIP-ACNC: 0.2 (ref 0.1–1.1)

## 2024-10-02 PROCEDURE — 99999 PR PBB SHADOW E&M-EST. PATIENT-LVL IV: CPT | Mod: PBBFAC,,, | Performed by: UROLOGY

## 2024-10-02 PROCEDURE — 3288F FALL RISK ASSESSMENT DOCD: CPT | Mod: CPTII,S$GLB,, | Performed by: UROLOGY

## 2024-10-02 PROCEDURE — 1101F PT FALLS ASSESS-DOCD LE1/YR: CPT | Mod: CPTII,S$GLB,, | Performed by: UROLOGY

## 2024-10-02 PROCEDURE — 99215 OFFICE O/P EST HI 40 MIN: CPT | Mod: S$GLB,,, | Performed by: UROLOGY

## 2024-10-02 PROCEDURE — 3078F DIAST BP <80 MM HG: CPT | Mod: CPTII,S$GLB,, | Performed by: UROLOGY

## 2024-10-02 PROCEDURE — 1126F AMNT PAIN NOTED NONE PRSNT: CPT | Mod: CPTII,S$GLB,, | Performed by: UROLOGY

## 2024-10-02 PROCEDURE — 3074F SYST BP LT 130 MM HG: CPT | Mod: CPTII,S$GLB,, | Performed by: UROLOGY

## 2024-10-02 PROCEDURE — 81003 URINALYSIS AUTO W/O SCOPE: CPT | Mod: QW,S$GLB,, | Performed by: UROLOGY

## 2024-10-02 PROCEDURE — 1159F MED LIST DOCD IN RCRD: CPT | Mod: CPTII,S$GLB,, | Performed by: UROLOGY

## 2024-10-02 PROCEDURE — 87086 URINE CULTURE/COLONY COUNT: CPT | Performed by: UROLOGY

## 2024-10-02 NOTE — PROGRESS NOTES
Subjective:       Patient ID: Lovely Madsen is a 79 y.o. female.    Chief Complaint: Post-op Evaluation      History of Present Illness:     Ms Madsen is here today with her .  She has newly diagnosed bladder cancer on 9-10-24; posterior wall carcinoma in situ.  She is s/p cystoscopy with bladder biopsies with fulguration of a 1 cm posterior bladder wall erythematous lesion with right ureteral stent exchange on 9-10-24.  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  Muscularis propria is present and uninvolved.  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  She is undergoing chronic right ureteral stent exchanges.  Her most recent right ureteral stent exchange (7 F X 24 cm) was on 9-10-24.    She has a history of a right diagnostic ureteroscopy with right ureteral stent placement on 2-4-22.  S/p cystoscopy with right ureteral stent was exchanged on 6-13-22.    She has mild intermittent dysuria.  No gross hematuria.  She feels that she is emptying her bladder.  She has a history of incomplete bladder emptying.  She stopped taking tamsulosin due to it causing problems with dizziness.  PVR on 8-14-24 was 0 m.  PVR on 4-10-24 was 135 ml.  She has a history of urinary retention after her right ureteral stent exchange on 3-19-24.  She has a history of urinary retention in October 2023.    She has a history of a 2.6 cm left posterior midpole renal mass s/p left robotic partial nephrectomy by me on 7-18-16.  Path:  Papillary renal cell carcinoma, grade 2, margins uninvolved, pT1aNX.    She has a history of colon cancer in 2001 s/p resection, XRT, and chemotherapy.         Past Medical History:   Diagnosis Date    Abnormal EKG 03/08/2024    Acute cystitis without hematuria 02/17/2022    First seen 02/17/2022, Last seen 09/19/2023    Cyst of  kidney, acquired 05/20/2016    First seen 05/20/2016, Last seen 12/14/2022    Dysuria 09/19/2023    History of kidney cancer 2018    Hydronephrosis with ureteral stricture, not elsewhere classified 11/08/2022    Hyperlipidemia     Hypertension     Kidney stone 05/25/2022    Malignant neoplasm of left kidney, except renal pelvis 2016    Neoplasm of uncertain behavior of left kidney 2016    Other hydronephrosis 03/04/2020    First seen 03/04/2020, Last seen 09/19/2023    Other retention of urine 12/16/2022    Overactive bladder 09/03/2019    First seen 09/03/2019, Last seen 09/19/2023    Preop cardiovascular exam 03/08/2024    S/P cystoscopy with ureteral stent placement     Stage 3a chronic kidney disease 03/08/2024     Family History   Problem Relation Name Age of Onset    Cancer Father      Alzheimer's disease Father       Social History     Socioeconomic History    Marital status:    Tobacco Use    Smoking status: Never    Smokeless tobacco: Never   Substance and Sexual Activity    Alcohol use: Not Currently    Drug use: Never     Outpatient Encounter Medications as of 10/2/2024   Medication Sig Dispense Refill    bisoprolol (ZEBETA) 5 MG tablet Take 5 mg by mouth once daily.      cetirizine (ZYRTEC) 10 MG tablet Take 10 mg by mouth.      colestipoL (COLESTID) 5 gram Pack MIX THE CONTENTS OF 1 PACKET IN LIQUID AS DIRECTED AND DRINK THREE TIMES DAILY      cyanocobalamin 500 MCG tablet Take by mouth.      donepeziL (ARICEPT) 10 MG tablet Take 10 mg by mouth.      EScitalopram oxalate (LEXAPRO) 10 MG tablet Take 10 mg by mouth.      folic acid (FOLVITE) 1 MG tablet Take 1 mg by mouth once daily.      gabapentin (NEURONTIN) 300 MG capsule Take 300 mg by mouth 3 (three) times daily.      hydroCHLOROthiazide (HYDRODIURIL) 12.5 MG Tab Take 12.5 mg by mouth.      hyoscyamine (LEVSIN) 0.125 mg Subl Place 1 tablet (0.125 mg total) under the tongue every 6 (six) hours as needed (Take 1 sublingual every 6 hours PRN  bladder spasms). 30 tablet 6    ketoconazole (NIZORAL) 2 % shampoo Apply topically twice a week.      memantine (NAMENDA) 10 MG Tab Take 10 mg by mouth 2 (two) times daily.      multivit-min-ferrous sulfate (ONE DAILY MULTI-VIT W-MINERAL) 4.5 mg iron Tab Take by mouth.      omeprazole (PRILOSEC) 40 MG capsule Take 40 mg by mouth.      paroxetine (PAXIL) 30 MG tablet Take 30 mg by mouth every morning.      potassium bicarbonate (K-LYTE) disintegrating tablet Take 25 mEq by mouth.      pravastatin (PRAVACHOL) 40 MG tablet Take 40 mg by mouth once daily.      amoxicillin-clavulanate 875-125mg (AUGMENTIN) 875-125 mg per tablet Take 1 tablet by mouth 2 (two) times daily. (Patient not taking: Reported on 10/2/2024) 14 tablet 0    clobetasoL (TEMOVATE) 0.05 % external solution Apply nightly as needed for itching or rash to scalp      HYDROcodone-acetaminophen (NORCO) 5-325 mg per tablet Take 1 tablet by mouth every 6 (six) hours as needed for Pain. (Patient not taking: Reported on 10/2/2024) 20 tablet 0    tamsulosin (FLOMAX) 0.4 mg Cap Take 1 capsule (0.4 mg total) by mouth once daily. (Patient not taking: Reported on 10/2/2024) 90 capsule 3    tamsulosin (FLOMAX) 0.4 mg Cap Take 1 capsule (0.4 mg total) by mouth once daily. (Patient not taking: Reported on 10/2/2024) 90 capsule 3     Facility-Administered Encounter Medications as of 10/2/2024   Medication Dose Route Frequency Provider Last Rate Last Admin    [START ON 10/21/2024] BCG live (SUSSY) 50 mg in 0.9% NaCl 50 mL bladder instillation  50 mg Intravesical Weekly             Review of Systems   Constitutional:  Negative for chills and fever.   Respiratory:  Negative for shortness of breath.    Cardiovascular:  Negative for chest pain.   Gastrointestinal:  Negative for nausea and vomiting.   Genitourinary:  Negative for difficulty urinating, dysuria and hematuria.   Musculoskeletal:  Negative for back pain.   Skin:  Negative for rash.   Neurological:  Negative for  "dizziness.   Psychiatric/Behavioral:  Negative for agitation.        Objective:     /74 (BP Location: Right arm, Patient Position: Sitting)   Pulse 61   Resp 16   Ht 5' 3" (1.6 m)   Wt 53.2 kg (117 lb 4.6 oz)   LMP  (LMP Unknown) Comment: postmenopausal  BMI 20.78 kg/m²     Physical Exam  Constitutional:       General: She is not in acute distress.  Pulmonary:      Effort: Pulmonary effort is normal.   Abdominal:      General: There is no distension.      Palpations: Abdomen is soft.   Neurological:      Mental Status: She is alert and oriented to person, place, and time.         Office Visit on 10/02/2024   Component Date Value Ref Range Status    POC Blood, Urine 10/02/2024 Positive (A)  Negative Final    POC Bilirubin, Urine 10/02/2024 Negative  Negative Final    POC Urobilinogen, Urine 10/02/2024 0.2  0.1 - 1.1 Final    POC Ketones, Urine 10/02/2024 Negative  Negative Final    POC Protein, Urine 10/02/2024 Positive (A)  Negative Final    POC Nitrates, Urine 10/02/2024 Positive (A)  Negative Final    POC Glucose, Urine 10/02/2024 Negative  Negative Final    pH, UA 10/02/2024 7.0   Final    POC Specific Gravity, Urine 10/02/2024 1.015  1.003 - 1.029 Final    POC Leukocytes, Urine 10/02/2024 Positive (A)  Negative Final        Results for orders placed or performed in visit on 04/10/24 (from the past 8760 hours)   POCT Bladder Scan   Result Value    POC Residual Urine Volume 135 (A)        Assessment:       1. 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 Other hydronephrosis    3. Feeling of incomplete bladder emptying    4. Acute cystitis without hematuria      Plan:     Orders Placed This Encounter    CULTURE, URINE    Prior authorization Order    POCT Urinalysis, Dipstick, Automated, W/O Scope    BCG live (SUSSY) 50 mg in 0.9% NaCl 50 mL bladder instillation          5-27-22  CT abdomen/pelvis without IV contrast.  MALLORY.  See report.  Status post appropriate placement of a double J " ureteral stent since the previous CT with significant improvement in the right sided hydronephrosis and hydroureter.  No obvious renal or ureteral calculus.  Tiny diverticulum along the left anterior bladder wall.  Status post previous left partial nephrectomy with no recurrent left renal mass.      9-30-24  Renal ultrasound.  See report.  Right kidney: There is mild right hydronephrosis.  No right renal mass.  There is a right ureteral stent in place.  Left kidney:  No left hydronephrosis.  Urinary bladder unremarkable.     8-8-24  Renal ultrasound.  See report.  Right kidney measures 10.4 cm in greatest length and the only focal abnormality is an upper pole 3 mm echogenic focus consistent with nonobstructive nephrolithiasis. Renal stent is in place. No hydronephrosis.  Left kidney has no focal nor diffuse abnormalities and no evidence for hydronephrosis.      4-8-24  Renal ultrasound.  See report.  Post surgical change of partial left nephrectomy. Right ureteral stent present. No hydronephrosis.     9-30-24  KUB.  See report.  Right ureteral stent remains. No definite nephrolithiasis appreciated. Pelvic vascular calcifications again noted. Mild constipation possible with bowel gas pattern nonobstructive.      8-8-24  KUB.  See report.  A right-sided ureteral stent is in place.Calcified phleboliths seen projecting over the left hemipelvis.      4-8-24  KUB.  A right-sided ureteral stent is in place. No calcification seen along the course of the stent. There is a calcification seen projecting over the left side of the abdomen that is rounded and measures approximately 10 mm. It is possible that this could represent a density within the bowel as this was not seen on the prior study from 03/06/2024.      5-24-23  MAG 3 renal scan with lasix.  BRG.  See report.  Differential renal function:  Left 47%  Right 53%.  T1/2 max of the left kidney is 12 minutes.  T1/2 max of the right kidney is 28 minutes.     1-20-22  MAG 3  renal scan with lasix.  BRG.  See report.     I reviewed all of the above imaging results.            3-6-24  Cr 1.2     10-17-23  Cr 1.12.  GFR 47.0.     I reviewed all of the above lab results.            Assessment:  - Newly diagnosed bladder cancer on 9-10-24.  Posterior wall carcinoma in situ.  - S/p cystoscopy with bladder biopsies with fulguration of a 1 cm posterior bladder wall erythematous lesion with right ureteral stent exchange on 9-10-24.  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  Muscularis propria is present and uninvolved.  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  She is undergoing chronic right ureteral stent exchanges.  Her most recent right ureteral stent exchange (7 F X 24 cm) was on 9-10-24.  - History of a right diagnostic ureteroscopy with right ureteral stent placement on 2-4-22.  S/p cystoscopy with right ureteral stent was exchanged on 6-13-22.  - Cystitis.  - Mild intermittent dysuria.  - history of incomplete bladder emptying.  She stopped taking tamsulosin due to it causing problems with dizziness.  PVR on 8-14-24 was 0 m.  PVR on 4-10-24 was 135 ml.  - History of urinary retention after her right ureteral stent exchange on 3-19-24.  - History of urinary retention in October 2023.  - CKD.  - History of a 2.6 cm left posterior midpole renal mass s/p left robotic partial nephrectomy by me on 7-18-16.  Path:  Papillary renal cell carcinoma, grade 2, margins uninvolved, pT1aNX.  - History of colon cancer in 2001 s/p resection, XRT, and chemotherapy.     Plan:  - I had a long discussion with the patient and her  regarding her pathology report and her newly diagnosed bladder cancer.  I discussed options with the patient and her  today and the mutual decision was made to proceed with a 6 week course of  induction BCG treatments.  I discussed the risks and benefits of BCG including the risk of disseminated BCG.  They both expressed understanding and accept the possible risk associated with BCG and they would both like for her to proceed with induction BCG.  - Urine culture today.  - Continue with timed and double voiding.  - I discussed dietary modifications with her today and I recommended she drink mostly water during the day.   - I sent a secure chat message to my nurse today to get her set up for induction BCG treatments starting the week of October 21, 2024 and to schedule her an appointment to see me the first or 2nd week of January 2025 with a PVR on arrival.

## 2024-10-02 NOTE — TELEPHONE ENCOUNTER
Left  for pt informing her that Dr. Bee wanted her to start induction BCG but we do not have the medication available; in fact, medication is on back order.  Told pt that she would be notified when medication is back in stock.

## 2024-10-04 ENCOUNTER — TELEPHONE (OUTPATIENT)
Dept: UROLOGY | Facility: CLINIC | Age: 79
End: 2024-10-04
Payer: MEDICARE

## 2024-10-04 LAB
BACTERIA UR CULT: NORMAL
BACTERIA UR CULT: NORMAL

## 2024-10-04 NOTE — TELEPHONE ENCOUNTER
Attempted to call pts  to inform him that pts urine was negative for a UTI; no answer.  Detailed message left on vm.

## 2024-11-11 ENCOUNTER — TELEPHONE (OUTPATIENT)
Dept: UROLOGY | Facility: CLINIC | Age: 79
End: 2024-11-11
Payer: MEDICARE

## 2024-11-11 NOTE — TELEPHONE ENCOUNTER
Called pt to inform her that we have enough BCG to do her induction treatment.  No answer.  Appt details left on vm.

## 2024-12-03 ENCOUNTER — OFFICE VISIT (OUTPATIENT)
Dept: UROLOGY | Facility: CLINIC | Age: 79
End: 2024-12-03
Payer: MEDICARE

## 2024-12-03 VITALS
BODY MASS INDEX: 20.79 KG/M2 | HEART RATE: 70 BPM | HEIGHT: 63 IN | WEIGHT: 117.31 LBS | DIASTOLIC BLOOD PRESSURE: 74 MMHG | SYSTOLIC BLOOD PRESSURE: 125 MMHG | RESPIRATION RATE: 14 BRPM | TEMPERATURE: 98 F

## 2024-12-03 DIAGNOSIS — N13.39 OTHER HYDRONEPHROSIS: ICD-10-CM

## 2024-12-03 DIAGNOSIS — R30.0 DYSURIA: ICD-10-CM

## 2024-12-03 DIAGNOSIS — D09.0: Primary | ICD-10-CM

## 2024-12-03 LAB
BILIRUB UR QL STRIP: NEGATIVE
GLUCOSE UR QL STRIP: NEGATIVE
KETONES UR QL STRIP: NEGATIVE
LEUKOCYTE ESTERASE UR QL STRIP: POSITIVE
PH, POC UA: 7
POC BLOOD, URINE: NEGATIVE
POC NITRATES, URINE: NEGATIVE
PROT UR QL STRIP: NEGATIVE
SP GR UR STRIP: 1.01 (ref 1–1.03)
UROBILINOGEN UR STRIP-ACNC: 0.2 (ref 0.1–1.1)

## 2024-12-03 PROCEDURE — 87086 URINE CULTURE/COLONY COUNT: CPT | Performed by: UROLOGY

## 2024-12-03 PROCEDURE — 99999 PR PBB SHADOW E&M-EST. PATIENT-LVL IV: CPT | Mod: PBBFAC,,, | Performed by: UROLOGY

## 2024-12-03 RX ORDER — CIPROFLOXACIN 500 MG/1
500 TABLET ORAL
Status: COMPLETED | OUTPATIENT
Start: 2024-12-03 | End: 2024-12-03

## 2024-12-03 RX ADMIN — CIPROFLOXACIN 500 MG: 500 TABLET ORAL at 10:12

## 2024-12-03 NOTE — PROGRESS NOTES
Chief Complaint:   Encounter Diagnoses   Name Primary?    Dysuria     Carcinoma in situ of posterior wall of urinary bladder Yes    Other hydronephrosis        HPI:  HPI Lovely Madsen familia 79 y.o. female who presents for BCG induction under Dr. Aguilar's recommendation.  She has not indwelling ureteral stent a for a ureteral stricture possibly secondary to retroperitoneal fibrosis.  She was recently diagnosed with CIS of the bladder.  She has not had any fevers or chills.  She does have some occasional dysuria.    History:  Social History     Tobacco Use    Smoking status: Never    Smokeless tobacco: Never   Substance Use Topics    Alcohol use: Not Currently    Drug use: Never     Past Medical History:   Diagnosis Date    Abnormal EKG 03/08/2024    Acute cystitis without hematuria 02/17/2022    First seen 02/17/2022, Last seen 09/19/2023    Cyst of kidney, acquired 05/20/2016    First seen 05/20/2016, Last seen 12/14/2022    Dysuria 09/19/2023    History of kidney cancer 2018    Hydronephrosis with ureteral stricture, not elsewhere classified 11/08/2022    Hyperlipidemia     Hypertension     Kidney stone 05/25/2022    Malignant neoplasm of left kidney, except renal pelvis 2016    Neoplasm of uncertain behavior of left kidney 2016    Other hydronephrosis 03/04/2020    First seen 03/04/2020, Last seen 09/19/2023    Other retention of urine 12/16/2022    Overactive bladder 09/03/2019    First seen 09/03/2019, Last seen 09/19/2023    Preop cardiovascular exam 03/08/2024    S/P cystoscopy with ureteral stent placement     Stage 3a chronic kidney disease 03/08/2024     Past Surgical History:   Procedure Laterality Date    COLON SURGERY      CYSTOSCOPY W/ URETERAL STENT PLACEMENT  06/13/2022    CYSTOSCOPY WITH URETEROSCOPY, RETROGRADE PYELOGRAPHY, AND INSERTION OF STENT Right 03/19/2024    Procedure: CYSTOSCOPY, WITH RETROGRADE PYELOGRAM AND URETERAL STENT INSERTION;  Surgeon: Wallace Bee MD;  Location: [facility] OR;   Service: Urology;  Laterality: Right;    CYSTOSCOPY WITH URETEROSCOPY, RETROGRADE PYELOGRAPHY, AND INSERTION OF STENT  02/04/2022    CYSTOSCOPY WITH URETEROSCOPY, RETROGRADE PYELOGRAPHY, AND INSERTION OF STENT Right 9/10/2024    Procedure: CYSTOSCOPY, WITH RETROGRADE PYELOGRAM AND URETERAL STENT INSERTION;  Surgeon: Wallace Bee MD;  Location: [facility];  Service: Urology;  Laterality: Right;    PARTIAL NEPHRECTOMY Left 07/18/2016    Left robotic partial nephrectomy     Family History   Problem Relation Name Age of Onset    Cancer Father      Alzheimer's disease Father         Current Outpatient Medications on File Prior to Visit   Medication Sig Dispense Refill    amoxicillin-clavulanate 875-125mg (AUGMENTIN) 875-125 mg per tablet Take 1 tablet by mouth 2 (two) times daily. 14 tablet 0    bisoprolol (ZEBETA) 5 MG tablet Take 5 mg by mouth once daily.      cetirizine (ZYRTEC) 10 MG tablet Take 10 mg by mouth.      colestipoL (COLESTID) 5 gram Pack MIX THE CONTENTS OF 1 PACKET IN LIQUID AS DIRECTED AND DRINK THREE TIMES DAILY      cyanocobalamin 500 MCG tablet Take by mouth.      donepeziL (ARICEPT) 10 MG tablet Take 10 mg by mouth.      EScitalopram oxalate (LEXAPRO) 10 MG tablet Take 10 mg by mouth.      folic acid (FOLVITE) 1 MG tablet Take 1 mg by mouth once daily.      gabapentin (NEURONTIN) 300 MG capsule Take 300 mg by mouth 3 (three) times daily.      hydroCHLOROthiazide (HYDRODIURIL) 12.5 MG Tab Take 12.5 mg by mouth.      HYDROcodone-acetaminophen (NORCO) 5-325 mg per tablet Take 1 tablet by mouth every 6 (six) hours as needed for Pain. 20 tablet 0    hyoscyamine (LEVSIN) 0.125 mg Subl Place 1 tablet (0.125 mg total) under the tongue every 6 (six) hours as needed (Take 1 sublingual every 6 hours PRN bladder spasms). 30 tablet 6    ketoconazole (NIZORAL) 2 % shampoo Apply topically twice a week.      memantine (NAMENDA) 10 MG Tab Take 10 mg by mouth 2 (two) times daily.      multivit-min-ferrous sulfate  "(ONE DAILY MULTI-VIT W-MINERAL) 4.5 mg iron Tab Take by mouth.      omeprazole (PRILOSEC) 40 MG capsule Take 40 mg by mouth.      paroxetine (PAXIL) 30 MG tablet Take 30 mg by mouth every morning.      potassium bicarbonate (K-LYTE) disintegrating tablet Take 25 mEq by mouth.      pravastatin (PRAVACHOL) 40 MG tablet Take 40 mg by mouth once daily.      tamsulosin (FLOMAX) 0.4 mg Cap Take 1 capsule (0.4 mg total) by mouth once daily. 90 capsule 3    tamsulosin (FLOMAX) 0.4 mg Cap Take 1 capsule (0.4 mg total) by mouth once daily. 90 capsule 3    clobetasoL (TEMOVATE) 0.05 % external solution Apply nightly as needed for itching or rash to scalp       No current facility-administered medications on file prior to visit.        Objective:     Vitals:    12/03/24 1005   BP: 125/74   BP Location: Right arm   Patient Position: Sitting   Pulse: 70   Resp: 14   Height: 5' 3" (1.6 m)      BMI Readings from Last 1 Encounters:   12/03/24 20.78 kg/m²          Physical Exam  No acute distress alert and oriented   Respirations even unlabored   Abdomen is soft nontender     Urinalysis shows moderate leukocyte esterase and blood.    Lab Results   Component Value Date    CREATININE 1.4 08/14/2024      Assessment:       1. 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 Dysuria    3. Other hydronephrosis        Plan:     1. 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 Dysuria    3. Other hydronephrosis       Orders Placed This Encounter    CULTURE, URINE    POCT Urinalysis, Dipstick, Automated, W/O Scope    ciprofloxacin HCl tablet 500 mg    BCG live (SUSSY) 50 mg in 0.9% NaCl 50 mL bladder instillation     Suspect pyuria due to indwelling stent.  We will send urine culture and treat with a single dose of Cipro preprocedure.  Go ahead and start BCG dose 1 of 6.  Discussed possible side effects with the patient and her .  "

## 2024-12-03 NOTE — PROGRESS NOTES
.....Patient in today for BCG treatment  #3/3.  One vial of BCG and 50ml perservative free sodium chloride 0.9% mixed as per instructions.  Using aseptic technique, a sterile fenestrated drape was placed over perineal area and perineal area cleansed with betadine.  Pt was catheterized using a #14Fr red rubber catheter to allow bladder emptying.  Using closed system, one vial BCG instilled via gravity directly into bladder.  Pt tolerated well.  Instructed pt to keep BCG solution in bladder for 2 hours.  Pt was given instructions to follow for the next 6 hours, including sitting on her toilet at home and using 2 cups of undiluted chlorine bleach and closing the lid.  Pt was told to allow bleach to stand for 15 minutes before flushing toilet.  He was also told to drink plenty of water to flush any remaining BCG bacteria.  Patient verbalized understanding.

## 2024-12-05 LAB — BACTERIA UR CULT: NORMAL

## 2024-12-10 ENCOUNTER — OFFICE VISIT (OUTPATIENT)
Dept: UROLOGY | Facility: CLINIC | Age: 79
End: 2024-12-10
Payer: MEDICARE

## 2024-12-10 ENCOUNTER — TELEPHONE (OUTPATIENT)
Dept: UROLOGY | Facility: CLINIC | Age: 79
End: 2024-12-10

## 2024-12-10 VITALS
HEIGHT: 63 IN | TEMPERATURE: 98 F | RESPIRATION RATE: 14 BRPM | BODY MASS INDEX: 20.79 KG/M2 | HEART RATE: 70 BPM | WEIGHT: 117.31 LBS | SYSTOLIC BLOOD PRESSURE: 128 MMHG | DIASTOLIC BLOOD PRESSURE: 72 MMHG

## 2024-12-10 DIAGNOSIS — R82.81 PYURIA: ICD-10-CM

## 2024-12-10 DIAGNOSIS — D09.0: Primary | ICD-10-CM

## 2024-12-10 LAB
BILIRUB UR QL STRIP: NEGATIVE
GLUCOSE UR QL STRIP: NEGATIVE
KETONES UR QL STRIP: NEGATIVE
LEUKOCYTE ESTERASE UR QL STRIP: POSITIVE
PH, POC UA: 7
POC BLOOD, URINE: POSITIVE
POC NITRATES, URINE: NEGATIVE
PROT UR QL STRIP: NEGATIVE
SP GR UR STRIP: 1.02 (ref 1–1.03)
UROBILINOGEN UR STRIP-ACNC: 0.2 (ref 0.1–1.1)

## 2024-12-10 PROCEDURE — 99999 PR PBB SHADOW E&M-EST. PATIENT-LVL IV: CPT | Mod: PBBFAC,,, | Performed by: UROLOGY

## 2024-12-10 PROCEDURE — 87086 URINE CULTURE/COLONY COUNT: CPT | Performed by: UROLOGY

## 2024-12-10 NOTE — PROGRESS NOTES
.....Patient in today for BCG treatment  #2/6.  One vial of BCG and 50ml perservative free sodium chloride 0.9% mixed as per instructions.  Using aseptic technique, a sterile fenestrated drape was placed over penis and perineal area cleansed with betadine.  Pt was catheterized using a #14Fr red rubber catheter to allow bladder emptying.  Using closed system, one vial BCG instilled via gravity directly into bladder.  Pt tolerated well.  Instructed pt to keep BCG solution in bladder for 2 hours.  Pt was given instructions to follow for the next 6 hours, including sitting on her toilet at home and using 2 cups of undiluted chlorine bleach and closing the lid.  Pt was told to allow bleach to stand for 15 minutes before flushing toilet.  He was also told to drink plenty of water to flush any remaining BCG bacteria.  Patient verbalized understanding.

## 2024-12-10 NOTE — PROGRESS NOTES
Chief Complaint:   Encounter Diagnoses   Name Primary?    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 female who presents  for BCG 2/6 for CIS of the bladder.  She has not indwelling stent.  She tolerated his 1st dose without complication    History:  Social History     Tobacco Use    Smoking status: Never    Smokeless tobacco: Never   Substance Use Topics    Alcohol use: Not Currently    Drug use: Never     Past Medical History:   Diagnosis Date    Abnormal EKG 03/08/2024    Acute cystitis without hematuria 02/17/2022    First seen 02/17/2022, Last seen 09/19/2023    Cyst of kidney, acquired 05/20/2016    First seen 05/20/2016, Last seen 12/14/2022    Dysuria 09/19/2023    History of kidney cancer 2018    Hydronephrosis with ureteral stricture, not elsewhere classified 11/08/2022    Hyperlipidemia     Hypertension     Kidney stone 05/25/2022    Malignant neoplasm of left kidney, except renal pelvis 2016    Neoplasm of uncertain behavior of left kidney 2016    Other hydronephrosis 03/04/2020    First seen 03/04/2020, Last seen 09/19/2023    Other retention of urine 12/16/2022    Overactive bladder 09/03/2019    First seen 09/03/2019, Last seen 09/19/2023    Preop cardiovascular exam 03/08/2024    S/P cystoscopy with ureteral stent placement     Stage 3a chronic kidney disease 03/08/2024     Past Surgical History:   Procedure Laterality Date    COLON SURGERY      CYSTOSCOPY W/ URETERAL STENT PLACEMENT  06/13/2022    CYSTOSCOPY WITH URETEROSCOPY, RETROGRADE PYELOGRAPHY, AND INSERTION OF STENT Right 03/19/2024    Procedure: CYSTOSCOPY, WITH RETROGRADE PYELOGRAM AND URETERAL STENT INSERTION;  Surgeon: Wallace Bee MD;  Location: [facility];  Service: Urology;  Laterality: Right;    CYSTOSCOPY WITH URETEROSCOPY, RETROGRADE PYELOGRAPHY, AND INSERTION OF STENT  02/04/2022    CYSTOSCOPY WITH URETEROSCOPY, RETROGRADE PYELOGRAPHY, AND INSERTION OF STENT  Right 9/10/2024    Procedure: CYSTOSCOPY, WITH RETROGRADE PYELOGRAM AND URETERAL STENT INSERTION;  Surgeon: Wallace Bee MD;  Location: [facility];  Service: Urology;  Laterality: Right;    PARTIAL NEPHRECTOMY Left 07/18/2016    Left robotic partial nephrectomy     Family History   Problem Relation Name Age of Onset    Cancer Father      Alzheimer's disease Father         Current Outpatient Medications on File Prior to Visit   Medication Sig Dispense Refill    amoxicillin-clavulanate 875-125mg (AUGMENTIN) 875-125 mg per tablet Take 1 tablet by mouth 2 (two) times daily. 14 tablet 0    bisoprolol (ZEBETA) 5 MG tablet Take 5 mg by mouth once daily.      cetirizine (ZYRTEC) 10 MG tablet Take 10 mg by mouth.      clobetasoL (TEMOVATE) 0.05 % external solution Apply nightly as needed for itching or rash to scalp      colestipoL (COLESTID) 5 gram Pack MIX THE CONTENTS OF 1 PACKET IN LIQUID AS DIRECTED AND DRINK THREE TIMES DAILY      cyanocobalamin 500 MCG tablet Take by mouth.      donepeziL (ARICEPT) 10 MG tablet Take 10 mg by mouth.      EScitalopram oxalate (LEXAPRO) 10 MG tablet Take 10 mg by mouth.      folic acid (FOLVITE) 1 MG tablet Take 1 mg by mouth once daily.      gabapentin (NEURONTIN) 300 MG capsule Take 300 mg by mouth 3 (three) times daily.      hydroCHLOROthiazide (HYDRODIURIL) 12.5 MG Tab Take 12.5 mg by mouth.      HYDROcodone-acetaminophen (NORCO) 5-325 mg per tablet Take 1 tablet by mouth every 6 (six) hours as needed for Pain. 20 tablet 0    hyoscyamine (LEVSIN) 0.125 mg Subl Place 1 tablet (0.125 mg total) under the tongue every 6 (six) hours as needed (Take 1 sublingual every 6 hours PRN bladder spasms). 30 tablet 6    ketoconazole (NIZORAL) 2 % shampoo Apply topically twice a week.      memantine (NAMENDA) 10 MG Tab Take 10 mg by mouth 2 (two) times daily.      multivit-min-ferrous sulfate (ONE DAILY MULTI-VIT W-MINERAL) 4.5 mg iron Tab Take by mouth.      omeprazole (PRILOSEC) 40 MG capsule  "Take 40 mg by mouth.      paroxetine (PAXIL) 30 MG tablet Take 30 mg by mouth every morning.      potassium bicarbonate (K-LYTE) disintegrating tablet Take 25 mEq by mouth.      pravastatin (PRAVACHOL) 40 MG tablet Take 40 mg by mouth once daily.      tamsulosin (FLOMAX) 0.4 mg Cap Take 1 capsule (0.4 mg total) by mouth once daily. 90 capsule 3    tamsulosin (FLOMAX) 0.4 mg Cap Take 1 capsule (0.4 mg total) by mouth once daily. 90 capsule 3     No current facility-administered medications on file prior to visit.        Objective:     Vitals:    12/10/24 1349   BP: 128/72   BP Location: Right arm   Patient Position: Sitting   Pulse: 70   Resp: 14   Temp: 98.4 °F (36.9 °C)   TempSrc: Oral   Weight: 53.2 kg (117 lb 4.6 oz)   Height: 5' 3" (1.6 m)      BMI Readings from Last 1 Encounters:   12/10/24 20.78 kg/m²          Physical Exam  No acute distress alert and oriented   Respirations even unlabored   Abdomen is soft nontender    Lab Results   Component Value Date    CREATININE 1.4 08/14/2024      Assessment:       1. 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 Pyuria        Plan:     1. 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 Pyuria       Orders Placed This Encounter    CULTURE, URINE    POCT Urinalysis, Dipstick, Automated, W/O Scope    BCG live (SUSSY) 50 mg in 0.9% NaCl 50 mL bladder instillation     "

## 2024-12-12 LAB — BACTERIA UR CULT: NORMAL

## 2024-12-17 ENCOUNTER — OFFICE VISIT (OUTPATIENT)
Dept: UROLOGY | Facility: CLINIC | Age: 79
End: 2024-12-17
Payer: MEDICARE

## 2024-12-17 VITALS
DIASTOLIC BLOOD PRESSURE: 73 MMHG | RESPIRATION RATE: 14 BRPM | SYSTOLIC BLOOD PRESSURE: 125 MMHG | BODY MASS INDEX: 20.79 KG/M2 | TEMPERATURE: 99 F | WEIGHT: 117.31 LBS | HEIGHT: 63 IN

## 2024-12-17 DIAGNOSIS — D09.0: Primary | ICD-10-CM

## 2024-12-17 LAB
BILIRUB UR QL STRIP: NEGATIVE
GLUCOSE UR QL STRIP: NEGATIVE
KETONES UR QL STRIP: NEGATIVE
LEUKOCYTE ESTERASE UR QL STRIP: POSITIVE
PH, POC UA: 7
POC BLOOD, URINE: NEGATIVE
POC NITRATES, URINE: NEGATIVE
PROT UR QL STRIP: NEGATIVE
SP GR UR STRIP: 1.02 (ref 1–1.03)
UROBILINOGEN UR STRIP-ACNC: 0.2 (ref 0.1–1.1)

## 2024-12-17 PROCEDURE — 99999 PR PBB SHADOW E&M-EST. PATIENT-LVL IV: CPT | Mod: PBBFAC,,, | Performed by: UROLOGY

## 2024-12-17 PROCEDURE — 51720 TREATMENT OF BLADDER LESION: CPT | Mod: S$GLB,,, | Performed by: UROLOGY

## 2024-12-17 PROCEDURE — 1126F AMNT PAIN NOTED NONE PRSNT: CPT | Mod: CPTII,S$GLB,, | Performed by: UROLOGY

## 2024-12-17 PROCEDURE — 1159F MED LIST DOCD IN RCRD: CPT | Mod: CPTII,S$GLB,, | Performed by: UROLOGY

## 2024-12-17 PROCEDURE — 99499 UNLISTED E&M SERVICE: CPT | Mod: S$GLB,,, | Performed by: UROLOGY

## 2024-12-17 PROCEDURE — 3074F SYST BP LT 130 MM HG: CPT | Mod: CPTII,S$GLB,, | Performed by: UROLOGY

## 2024-12-17 PROCEDURE — 81003 URINALYSIS AUTO W/O SCOPE: CPT | Mod: QW,S$GLB,, | Performed by: UROLOGY

## 2024-12-17 PROCEDURE — 3078F DIAST BP <80 MM HG: CPT | Mod: CPTII,S$GLB,, | Performed by: UROLOGY

## 2024-12-17 NOTE — PROGRESS NOTES
Chief Complaint:   Encounter Diagnosis   Name Primary?    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 female who presents with follow up of BCG induction.  She has CIS of the bladder diagnosed by Dr. Bee.  He recommended BCG induction.  She has tolerated the 1st few doses fine.  She is here for number 3.     History:  Social History     Tobacco Use    Smoking status: Never    Smokeless tobacco: Never   Substance Use Topics    Alcohol use: Not Currently    Drug use: Never     Past Medical History:   Diagnosis Date    Abnormal EKG 03/08/2024    Acute cystitis without hematuria 02/17/2022    First seen 02/17/2022, Last seen 09/19/2023    Cyst of kidney, acquired 05/20/2016    First seen 05/20/2016, Last seen 12/14/2022    Dysuria 09/19/2023    History of kidney cancer 2018    Hydronephrosis with ureteral stricture, not elsewhere classified 11/08/2022    Hyperlipidemia     Hypertension     Kidney stone 05/25/2022    Malignant neoplasm of left kidney, except renal pelvis 2016    Neoplasm of uncertain behavior of left kidney 2016    Other hydronephrosis 03/04/2020    First seen 03/04/2020, Last seen 09/19/2023    Other retention of urine 12/16/2022    Overactive bladder 09/03/2019    First seen 09/03/2019, Last seen 09/19/2023    Preop cardiovascular exam 03/08/2024    S/P cystoscopy with ureteral stent placement     Stage 3a chronic kidney disease 03/08/2024     Past Surgical History:   Procedure Laterality Date    COLON SURGERY      CYSTOSCOPY W/ URETERAL STENT PLACEMENT  06/13/2022    CYSTOSCOPY WITH URETEROSCOPY, RETROGRADE PYELOGRAPHY, AND INSERTION OF STENT Right 03/19/2024    Procedure: CYSTOSCOPY, WITH RETROGRADE PYELOGRAM AND URETERAL STENT INSERTION;  Surgeon: Wallace Bee MD;  Location: [facility];  Service: Urology;  Laterality: Right;    CYSTOSCOPY WITH URETEROSCOPY, RETROGRADE PYELOGRAPHY, AND INSERTION OF STENT  02/04/2022    CYSTOSCOPY WITH  URETEROSCOPY, RETROGRADE PYELOGRAPHY, AND INSERTION OF STENT Right 9/10/2024    Procedure: CYSTOSCOPY, WITH RETROGRADE PYELOGRAM AND URETERAL STENT INSERTION;  Surgeon: Wallace Bee MD;  Location: [facility];  Service: Urology;  Laterality: Right;    PARTIAL NEPHRECTOMY Left 07/18/2016    Left robotic partial nephrectomy     Family History   Problem Relation Name Age of Onset    Cancer Father      Alzheimer's disease Father         Current Outpatient Medications on File Prior to Visit   Medication Sig Dispense Refill    amoxicillin-clavulanate 875-125mg (AUGMENTIN) 875-125 mg per tablet Take 1 tablet by mouth 2 (two) times daily. 14 tablet 0    bisoprolol (ZEBETA) 5 MG tablet Take 5 mg by mouth once daily.      cetirizine (ZYRTEC) 10 MG tablet Take 10 mg by mouth.      clobetasoL (TEMOVATE) 0.05 % external solution Apply nightly as needed for itching or rash to scalp      colestipoL (COLESTID) 5 gram Pack MIX THE CONTENTS OF 1 PACKET IN LIQUID AS DIRECTED AND DRINK THREE TIMES DAILY      cyanocobalamin 500 MCG tablet Take by mouth.      donepeziL (ARICEPT) 10 MG tablet Take 10 mg by mouth.      EScitalopram oxalate (LEXAPRO) 10 MG tablet Take 10 mg by mouth.      folic acid (FOLVITE) 1 MG tablet Take 1 mg by mouth once daily.      gabapentin (NEURONTIN) 300 MG capsule Take 300 mg by mouth 3 (three) times daily.      hydroCHLOROthiazide (HYDRODIURIL) 12.5 MG Tab Take 12.5 mg by mouth.      HYDROcodone-acetaminophen (NORCO) 5-325 mg per tablet Take 1 tablet by mouth every 6 (six) hours as needed for Pain. 20 tablet 0    hyoscyamine (LEVSIN) 0.125 mg Subl Place 1 tablet (0.125 mg total) under the tongue every 6 (six) hours as needed (Take 1 sublingual every 6 hours PRN bladder spasms). 30 tablet 6    ketoconazole (NIZORAL) 2 % shampoo Apply topically twice a week.      memantine (NAMENDA) 10 MG Tab Take 10 mg by mouth 2 (two) times daily.      multivit-min-ferrous sulfate (ONE DAILY MULTI-VIT W-MINERAL) 4.5 mg iron  "Tab Take by mouth.      omeprazole (PRILOSEC) 40 MG capsule Take 40 mg by mouth.      paroxetine (PAXIL) 30 MG tablet Take 30 mg by mouth every morning.      potassium bicarbonate (K-LYTE) disintegrating tablet Take 25 mEq by mouth.      pravastatin (PRAVACHOL) 40 MG tablet Take 40 mg by mouth once daily.      tamsulosin (FLOMAX) 0.4 mg Cap Take 1 capsule (0.4 mg total) by mouth once daily. 90 capsule 3    tamsulosin (FLOMAX) 0.4 mg Cap Take 1 capsule (0.4 mg total) by mouth once daily. 90 capsule 3     No current facility-administered medications on file prior to visit.        Objective:     Vitals:    12/17/24 1340   BP: 125/73   BP Location: Right arm   Patient Position: Sitting   Resp: 14   Temp: 98.6 °F (37 °C)   TempSrc: Oral   Weight: 53.2 kg (117 lb 4.6 oz)   Height: 5' 3" (1.6 m)      BMI Readings from Last 1 Encounters:   12/17/24 20.78 kg/m²          Physical Exam  No acute distress alert and oriented   Respirations even unlabored    Lab Results   Component Value Date    CREATININE 1.4 08/14/2024      Assessment:       1. 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

## 2024-12-17 NOTE — PROGRESS NOTES
....Patient in today for BCG treatment.  #3/6.  One vial of BCG and 50ml perservative free sodium chloride 0.9% mixed as per instructions.  Using aseptic technique, a sterile fenestrated drape was placed over perineal area.  Pt was catheterized using a #14Fr red rubber catheter to allow bladder emptying.  Private area cleansed with betadine.  Using closed system, BCG instilled via gravity directly into bladder.  Pt tolerated well.  Instructed pt to keep BCG solution in bladder for 2 hours.  Pt was given instructions to follow for the next 6 hours, including sitting on her toilet at home and using 2 cups of undiluted chlorine bleach and closing the lid.  Pt was told to allow bleach to stand for 15 minutes before flushing toilet.  She was also told to drink plenty of water to flush any remaining BCG bacteria.  Patient verbalized understanding.

## 2024-12-24 ENCOUNTER — OFFICE VISIT (OUTPATIENT)
Dept: UROLOGY | Facility: CLINIC | Age: 79
End: 2024-12-24
Payer: MEDICARE

## 2024-12-24 VITALS
BODY MASS INDEX: 20.78 KG/M2 | DIASTOLIC BLOOD PRESSURE: 65 MMHG | RESPIRATION RATE: 18 BRPM | HEART RATE: 96 BPM | SYSTOLIC BLOOD PRESSURE: 107 MMHG | HEIGHT: 63 IN

## 2024-12-24 DIAGNOSIS — N39.0 RECURRENT UTI: ICD-10-CM

## 2024-12-24 DIAGNOSIS — D09.0: Primary | ICD-10-CM

## 2024-12-24 PROCEDURE — 1101F PT FALLS ASSESS-DOCD LE1/YR: CPT | Mod: CPTII,S$GLB,, | Performed by: UROLOGY

## 2024-12-24 PROCEDURE — 87086 URINE CULTURE/COLONY COUNT: CPT | Performed by: UROLOGY

## 2024-12-24 PROCEDURE — 3078F DIAST BP <80 MM HG: CPT | Mod: CPTII,S$GLB,, | Performed by: UROLOGY

## 2024-12-24 PROCEDURE — 3074F SYST BP LT 130 MM HG: CPT | Mod: CPTII,S$GLB,, | Performed by: UROLOGY

## 2024-12-24 PROCEDURE — 99214 OFFICE O/P EST MOD 30 MIN: CPT | Mod: S$GLB,,, | Performed by: UROLOGY

## 2024-12-24 PROCEDURE — 99999 PR PBB SHADOW E&M-EST. PATIENT-LVL III: CPT | Mod: PBBFAC,,, | Performed by: UROLOGY

## 2024-12-24 PROCEDURE — 1159F MED LIST DOCD IN RCRD: CPT | Mod: CPTII,S$GLB,, | Performed by: UROLOGY

## 2024-12-24 PROCEDURE — 81003 URINALYSIS AUTO W/O SCOPE: CPT | Mod: QW,S$GLB,, | Performed by: UROLOGY

## 2024-12-24 PROCEDURE — 3288F FALL RISK ASSESSMENT DOCD: CPT | Mod: CPTII,S$GLB,, | Performed by: UROLOGY

## 2024-12-24 PROCEDURE — 1126F AMNT PAIN NOTED NONE PRSNT: CPT | Mod: CPTII,S$GLB,, | Performed by: UROLOGY

## 2024-12-24 NOTE — PROGRESS NOTES
Chief Complaint:   Encounter Diagnoses   Name Primary?    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 female who presents today for her next BCG does.  Her  states that they had a bad night.  Patient has been having urinary incontinence and not responding to commands as previous.  She has had no fever or chills.  The  thinks that she could be dehydrated.  He is also concerned about a UTI.  She does have an indwelling stent.    History:  Social History     Tobacco Use    Smoking status: Never    Smokeless tobacco: Never   Substance Use Topics    Alcohol use: Not Currently    Drug use: Never     Past Medical History:   Diagnosis Date    Abnormal EKG 03/08/2024    Acute cystitis without hematuria 02/17/2022    First seen 02/17/2022, Last seen 09/19/2023    Cyst of kidney, acquired 05/20/2016    First seen 05/20/2016, Last seen 12/14/2022    Dysuria 09/19/2023    History of kidney cancer 2018    Hydronephrosis with ureteral stricture, not elsewhere classified 11/08/2022    Hyperlipidemia     Hypertension     Kidney stone 05/25/2022    Malignant neoplasm of left kidney, except renal pelvis 2016    Neoplasm of uncertain behavior of left kidney 2016    Other hydronephrosis 03/04/2020    First seen 03/04/2020, Last seen 09/19/2023    Other retention of urine 12/16/2022    Overactive bladder 09/03/2019    First seen 09/03/2019, Last seen 09/19/2023    Preop cardiovascular exam 03/08/2024    S/P cystoscopy with ureteral stent placement     Stage 3a chronic kidney disease 03/08/2024     Past Surgical History:   Procedure Laterality Date    COLON SURGERY      CYSTOSCOPY W/ URETERAL STENT PLACEMENT  06/13/2022    CYSTOSCOPY WITH URETEROSCOPY, RETROGRADE PYELOGRAPHY, AND INSERTION OF STENT Right 03/19/2024    Procedure: CYSTOSCOPY, WITH RETROGRADE PYELOGRAM AND URETERAL STENT INSERTION;  Surgeon: Wallace Bee MD;  Location: [facility];   Service: Urology;  Laterality: Right;    CYSTOSCOPY WITH URETEROSCOPY, RETROGRADE PYELOGRAPHY, AND INSERTION OF STENT  02/04/2022    CYSTOSCOPY WITH URETEROSCOPY, RETROGRADE PYELOGRAPHY, AND INSERTION OF STENT Right 9/10/2024    Procedure: CYSTOSCOPY, WITH RETROGRADE PYELOGRAM AND URETERAL STENT INSERTION;  Surgeon: Wallace Bee MD;  Location: [facility];  Service: Urology;  Laterality: Right;    PARTIAL NEPHRECTOMY Left 07/18/2016    Left robotic partial nephrectomy     Family History   Problem Relation Name Age of Onset    Cancer Father      Alzheimer's disease Father         Current Outpatient Medications on File Prior to Visit   Medication Sig Dispense Refill    amoxicillin-clavulanate 875-125mg (AUGMENTIN) 875-125 mg per tablet Take 1 tablet by mouth 2 (two) times daily. 14 tablet 0    bisoprolol (ZEBETA) 5 MG tablet Take 5 mg by mouth once daily.      cetirizine (ZYRTEC) 10 MG tablet Take 10 mg by mouth.      colestipoL (COLESTID) 5 gram Pack MIX THE CONTENTS OF 1 PACKET IN LIQUID AS DIRECTED AND DRINK THREE TIMES DAILY      cyanocobalamin 500 MCG tablet Take by mouth.      donepeziL (ARICEPT) 10 MG tablet Take 10 mg by mouth.      EScitalopram oxalate (LEXAPRO) 10 MG tablet Take 10 mg by mouth.      folic acid (FOLVITE) 1 MG tablet Take 1 mg by mouth once daily.      gabapentin (NEURONTIN) 300 MG capsule Take 300 mg by mouth 3 (three) times daily.      hydroCHLOROthiazide (HYDRODIURIL) 12.5 MG Tab Take 12.5 mg by mouth.      HYDROcodone-acetaminophen (NORCO) 5-325 mg per tablet Take 1 tablet by mouth every 6 (six) hours as needed for Pain. 20 tablet 0    hyoscyamine (LEVSIN) 0.125 mg Subl Place 1 tablet (0.125 mg total) under the tongue every 6 (six) hours as needed (Take 1 sublingual every 6 hours PRN bladder spasms). 30 tablet 6    ketoconazole (NIZORAL) 2 % shampoo Apply topically twice a week.      memantine (NAMENDA) 10 MG Tab Take 10 mg by mouth 2 (two) times daily.      multivit-min-ferrous sulfate  "(ONE DAILY MULTI-VIT W-MINERAL) 4.5 mg iron Tab Take by mouth.      omeprazole (PRILOSEC) 40 MG capsule Take 40 mg by mouth.      paroxetine (PAXIL) 30 MG tablet Take 30 mg by mouth every morning.      potassium bicarbonate (K-LYTE) disintegrating tablet Take 25 mEq by mouth.      pravastatin (PRAVACHOL) 40 MG tablet Take 40 mg by mouth once daily.      tamsulosin (FLOMAX) 0.4 mg Cap Take 1 capsule (0.4 mg total) by mouth once daily. 90 capsule 3    tamsulosin (FLOMAX) 0.4 mg Cap Take 1 capsule (0.4 mg total) by mouth once daily. 90 capsule 3    clobetasoL (TEMOVATE) 0.05 % external solution Apply nightly as needed for itching or rash to scalp       No current facility-administered medications on file prior to visit.        Objective:     Vitals:    12/24/24 0947   BP: 107/65   Pulse: 96   Resp: 18   Height: 5' 3" (1.6 m)      BMI Readings from Last 1 Encounters:   12/24/24 20.78 kg/m²          Physical Exam    No acute distress  Abdomen is soft    In and out catheterization was performed for urinalysis.    Urinalysis shows large leuks and blood.  Lab Results   Component Value Date    CREATININE 1.4 08/14/2024      Assessment:       1. 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 Recurrent UTI        Plan:     1. 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 Recurrent UTI       Orders Placed This Encounter    Urine culture    POCT Urinalysis, Dipstick, Automated, W/O Scope     History of CIS of the bladder, indwelling stent for ureteral stricture and recurrent UTIs.  Recommend holding BCG today due to changes in her status.  We will send a urine culture.  Discussed if she does not respond to fluids today that she is to be brought to the emergency room for further evaluation.  We will reschedule BCG in one-week.  "

## 2024-12-25 LAB
BACTERIA UR CULT: NORMAL
BACTERIA UR CULT: NORMAL

## 2025-01-03 ENCOUNTER — TELEPHONE (OUTPATIENT)
Dept: UROLOGY | Facility: CLINIC | Age: 80
End: 2025-01-03
Payer: MEDICARE

## 2025-01-03 NOTE — TELEPHONE ENCOUNTER
Returend pt call there was no answer     ----- Message from Kenzie sent at 1/3/2025  8:12 AM CST -----  Contact: Low/Spouse  Type:  Sooner Apoointment Request    Caller is requesting a sooner appointment. Caller will not accept being placed on the waitlist and is requesting a message be sent to doctor.  Name of Caller:Low  When is the first available appointment?unknown  Symptoms:BCG injection  Would the patient rather a call back or a response via [facility]? call  Best Call Back Number:[phone]   Additional Information: Patient's spouse reports patient is feeling unwell and has requested to cancel visit and request assistance in rescheduling for another date.  Thank you,  GH

## 2025-01-03 NOTE — TELEPHONE ENCOUNTER
Patient canceled BCG appt this morning. I called pt and spoke to her . Was told that pt was not doing well and had a UTI.  Stated he would call back to reschedule.

## 2025-01-28 ENCOUNTER — TELEPHONE (OUTPATIENT)
Dept: UROLOGY | Facility: CLINIC | Age: 80
End: 2025-01-28
Payer: MEDICARE

## 2025-01-28 NOTE — TELEPHONE ENCOUNTER
Attempted to contact pt to followup on her BCG treatments. Last time we spoke, pt was in the hospital with a severe UTI; need to know if they want to proceed with remaining treatments.  Left vm.

## 2025-01-28 NOTE — TELEPHONE ENCOUNTER
Patient's  called me back and informed me that pt was under [facility] now and she would not continue receiving BCG.  MD notified.

## (undated) DEVICE — SOL IRRI STRL WATER 1000ML

## (undated) DEVICE — SOL NACL IRR 3000ML

## (undated) DEVICE — SYR SALINE FLSH PRFL STRL 10ML

## (undated) DEVICE — COVER LIGHT HANDLE 80/CA

## (undated) DEVICE — GLOVE BIOGEL 7.0

## (undated) DEVICE — SYR ONLY LUER LOCK 20CC

## (undated) DEVICE — BAG DRAIN ANTI REFLUX 4000ML

## (undated) DEVICE — JELLY SURGILUBE LUBE PKT 3GM

## (undated) DEVICE — BOWL STERILE LARGE 32OZ

## (undated) DEVICE — MARKER SKIN RULER STERILE

## (undated) DEVICE — TOWEL OR DISP STRL BLUE 4/PK

## (undated) DEVICE — SOL IRR NACL .9% 3000ML

## (undated) DEVICE — GUIDE WIRE MOTION .035 X 150CM

## (undated) DEVICE — GOWN POLY REINF X-LONG XL

## (undated) DEVICE — SPONGE COTTON TRAY 4X4IN

## (undated) DEVICE — ADAPTER TUOHY BORST 6FR

## (undated) DEVICE — LEGGING CLEAR POLY 2/PACK

## (undated) DEVICE — CONTAINER SPECIMEN OR STER 4OZ

## (undated) DEVICE — COVER CAMERA OPERATING ROOM

## (undated) DEVICE — POSITIONER HEAD DONUT 9IN FOAM

## (undated) DEVICE — TRAY SKIN SCRUB WET 4 COMPART

## (undated) DEVICE — CATH POLLACK OPEN-END FLEXI-TI

## (undated) DEVICE — CANISTER SUCTION JUMBO 12L

## (undated) DEVICE — CATH URTRL OPEN END STR TIP 5F

## (undated) DEVICE — KIT TURNOVER

## (undated) DEVICE — STENT URETERAL UNIV 7FR 26CM: Type: IMPLANTABLE DEVICE | Site: URETER | Status: NON-FUNCTIONAL

## (undated) DEVICE — GOWN POLY REINF BRTH SLV XL

## (undated) DEVICE — TRAY SKIN SCRUB WET PREMIUM

## (undated) DEVICE — IRRIGATION SET Y-TYPE TUR/BLAD

## (undated) DEVICE — DRAPE T CYSTOSCOPY STERILE

## (undated) DEVICE — Device

## (undated) DEVICE — GUIDEWIRE AMPLATZ .035X260

## (undated) DEVICE — GOWN SMARTGOWN LVL4 X-LONG XL

## (undated) DEVICE — UROVIEW 2600/2800

## (undated) DEVICE — SET IRR URLGY 2LINE UNIV SPIKE

## (undated) DEVICE — SUPPORT ULNA NERVE PROTECTOR

## (undated) DEVICE — JELLY LUBRICANT STERILE 4 OZ